# Patient Record
Sex: FEMALE | Race: BLACK OR AFRICAN AMERICAN | NOT HISPANIC OR LATINO | URBAN - METROPOLITAN AREA
[De-identification: names, ages, dates, MRNs, and addresses within clinical notes are randomized per-mention and may not be internally consistent; named-entity substitution may affect disease eponyms.]

---

## 2019-01-23 ENCOUNTER — EMERGENCY (EMERGENCY)
Facility: HOSPITAL | Age: 74
LOS: 0 days | Discharge: HOME | End: 2019-01-23
Attending: EMERGENCY MEDICINE
Payer: MEDICARE

## 2019-01-23 VITALS
SYSTOLIC BLOOD PRESSURE: 129 MMHG | DIASTOLIC BLOOD PRESSURE: 70 MMHG | OXYGEN SATURATION: 99 % | HEART RATE: 65 BPM | RESPIRATION RATE: 18 BRPM | TEMPERATURE: 98 F

## 2019-01-23 VITALS
OXYGEN SATURATION: 96 % | DIASTOLIC BLOOD PRESSURE: 97 MMHG | TEMPERATURE: 98 F | RESPIRATION RATE: 16 BRPM | HEART RATE: 75 BPM | HEIGHT: 66 IN | WEIGHT: 220.9 LBS | SYSTOLIC BLOOD PRESSURE: 186 MMHG

## 2019-01-23 DIAGNOSIS — E11.9 TYPE 2 DIABETES MELLITUS WITHOUT COMPLICATIONS: ICD-10-CM

## 2019-01-23 DIAGNOSIS — I10 ESSENTIAL (PRIMARY) HYPERTENSION: ICD-10-CM

## 2019-01-23 DIAGNOSIS — R51 HEADACHE: ICD-10-CM

## 2019-01-23 LAB
ALBUMIN SERPL ELPH-MCNC: 3.4 G/DL — SIGNIFICANT CHANGE UP (ref 3.3–5)
ALP SERPL-CCNC: 70 U/L — SIGNIFICANT CHANGE UP (ref 40–120)
ALT FLD-CCNC: 22 U/L — SIGNIFICANT CHANGE UP (ref 12–78)
ANION GAP SERPL CALC-SCNC: 6 MMOL/L — SIGNIFICANT CHANGE UP (ref 5–17)
APTT BLD: 32.8 SEC — SIGNIFICANT CHANGE UP (ref 28.5–37)
AST SERPL-CCNC: 23 U/L — SIGNIFICANT CHANGE UP (ref 15–37)
BASOPHILS # BLD AUTO: 0.06 K/UL — SIGNIFICANT CHANGE UP (ref 0–0.2)
BASOPHILS NFR BLD AUTO: 0.6 % — SIGNIFICANT CHANGE UP (ref 0–2)
BILIRUB SERPL-MCNC: 0.3 MG/DL — SIGNIFICANT CHANGE UP (ref 0.2–1.2)
BUN SERPL-MCNC: 18 MG/DL — SIGNIFICANT CHANGE UP (ref 7–23)
CALCIUM SERPL-MCNC: 8.7 MG/DL — SIGNIFICANT CHANGE UP (ref 8.5–10.1)
CHLORIDE SERPL-SCNC: 103 MMOL/L — SIGNIFICANT CHANGE UP (ref 96–108)
CK MB BLD-MCNC: 1.4 % — SIGNIFICANT CHANGE UP (ref 0–3.5)
CK MB CFR SERPL CALC: 2.7 NG/ML — SIGNIFICANT CHANGE UP (ref 0.5–3.6)
CK SERPL-CCNC: 189 U/L — SIGNIFICANT CHANGE UP (ref 26–192)
CO2 SERPL-SCNC: 30 MMOL/L — SIGNIFICANT CHANGE UP (ref 22–31)
CREAT SERPL-MCNC: 0.97 MG/DL — SIGNIFICANT CHANGE UP (ref 0.5–1.3)
EOSINOPHIL # BLD AUTO: 0.25 K/UL — SIGNIFICANT CHANGE UP (ref 0–0.5)
EOSINOPHIL NFR BLD AUTO: 2.6 % — SIGNIFICANT CHANGE UP (ref 0–6)
GLUCOSE BLDC GLUCOMTR-MCNC: 159 MG/DL — HIGH (ref 70–99)
GLUCOSE SERPL-MCNC: 159 MG/DL — HIGH (ref 70–99)
HCT VFR BLD CALC: 37.6 % — SIGNIFICANT CHANGE UP (ref 34.5–45)
HGB BLD-MCNC: 11.6 G/DL — SIGNIFICANT CHANGE UP (ref 11.5–15.5)
IMM GRANULOCYTES NFR BLD AUTO: 0.3 % — SIGNIFICANT CHANGE UP (ref 0–1.5)
INR BLD: 1.03 RATIO — SIGNIFICANT CHANGE UP (ref 0.88–1.16)
LYMPHOCYTES # BLD AUTO: 3.74 K/UL — HIGH (ref 1–3.3)
LYMPHOCYTES # BLD AUTO: 39.4 % — SIGNIFICANT CHANGE UP (ref 13–44)
MCHC RBC-ENTMCNC: 24.8 PG — LOW (ref 27–34)
MCHC RBC-ENTMCNC: 30.9 GM/DL — LOW (ref 32–36)
MCV RBC AUTO: 80.5 FL — SIGNIFICANT CHANGE UP (ref 80–100)
MONOCYTES # BLD AUTO: 0.5 K/UL — SIGNIFICANT CHANGE UP (ref 0–0.9)
MONOCYTES NFR BLD AUTO: 5.3 % — SIGNIFICANT CHANGE UP (ref 2–14)
NEUTROPHILS # BLD AUTO: 4.91 K/UL — SIGNIFICANT CHANGE UP (ref 1.8–7.4)
NEUTROPHILS NFR BLD AUTO: 51.8 % — SIGNIFICANT CHANGE UP (ref 43–77)
NRBC # BLD: 0 /100 WBCS — SIGNIFICANT CHANGE UP (ref 0–0)
PLATELET # BLD AUTO: 257 K/UL — SIGNIFICANT CHANGE UP (ref 150–400)
POTASSIUM SERPL-MCNC: 4.5 MMOL/L — SIGNIFICANT CHANGE UP (ref 3.5–5.3)
POTASSIUM SERPL-SCNC: 4.5 MMOL/L — SIGNIFICANT CHANGE UP (ref 3.5–5.3)
PROT SERPL-MCNC: 7.9 GM/DL — SIGNIFICANT CHANGE UP (ref 6–8.3)
PROTHROM AB SERPL-ACNC: 11.6 SEC — SIGNIFICANT CHANGE UP (ref 10–12.9)
RBC # BLD: 4.67 M/UL — SIGNIFICANT CHANGE UP (ref 3.8–5.2)
RBC # FLD: 14.1 % — SIGNIFICANT CHANGE UP (ref 10.3–14.5)
SODIUM SERPL-SCNC: 139 MMOL/L — SIGNIFICANT CHANGE UP (ref 135–145)
TROPONIN I SERPL-MCNC: <.015 NG/ML — SIGNIFICANT CHANGE UP (ref 0.01–0.04)
WBC # BLD: 9.49 K/UL — SIGNIFICANT CHANGE UP (ref 3.8–10.5)
WBC # FLD AUTO: 9.49 K/UL — SIGNIFICANT CHANGE UP (ref 3.8–10.5)

## 2019-01-23 PROCEDURE — 99284 EMERGENCY DEPT VISIT MOD MDM: CPT | Mod: 25

## 2019-01-23 PROCEDURE — 70450 CT HEAD/BRAIN W/O DYE: CPT | Mod: 26

## 2019-01-23 RX ORDER — METOCLOPRAMIDE HCL 10 MG
10 TABLET ORAL ONCE
Qty: 0 | Refills: 0 | Status: COMPLETED | OUTPATIENT
Start: 2019-01-23 | End: 2019-01-23

## 2019-01-23 RX ORDER — SODIUM CHLORIDE 9 MG/ML
1000 INJECTION INTRAMUSCULAR; INTRAVENOUS; SUBCUTANEOUS ONCE
Qty: 0 | Refills: 0 | Status: COMPLETED | OUTPATIENT
Start: 2019-01-23 | End: 2019-01-23

## 2019-01-23 RX ORDER — ACETAMINOPHEN 500 MG
975 TABLET ORAL ONCE
Qty: 0 | Refills: 0 | Status: COMPLETED | OUTPATIENT
Start: 2019-01-23 | End: 2019-01-23

## 2019-01-23 RX ADMIN — Medication 975 MILLIGRAM(S): at 01:54

## 2019-01-23 RX ADMIN — Medication 975 MILLIGRAM(S): at 01:24

## 2019-01-23 RX ADMIN — Medication 10 MILLIGRAM(S): at 01:25

## 2019-01-23 RX ADMIN — SODIUM CHLORIDE 1000 MILLILITER(S): 9 INJECTION INTRAMUSCULAR; INTRAVENOUS; SUBCUTANEOUS at 04:45

## 2019-01-23 NOTE — ED PROVIDER NOTE - OBJECTIVE STATEMENT
Pertinent PMH/PSH/FHx/SHx and Review of Systems contained within:    74yo F w PMH of breast ca, HTN & DM w neuropathy on gabapentin presents to ED for eval of R sided HA w photophobia & subjective parestheisas for the last 2d.  Pt states sx worse when she goes to bed at night.  Denies fever, chills, abd pain, nausea, vomiting, diaphoresis, palpitations, head trauma, LOC.  Pt has not taken any meds for sx PTA.    No fever/chills, +photophobia, no eye pain/changes in vision, No ear pain/sore throat/dysphagia, No palpitations, no SOB/cough/wheeze/stridor, No abdominal pain, No N/V/D, no dysuria/frequency/discharge, No neck/back pain, no rash

## 2019-01-23 NOTE — ED ADULT NURSE NOTE - NSIMPLEMENTINTERV_GEN_ALL_ED
Implemented All Universal Safety Interventions:  [place] to call system. Call bell, personal items and telephone within reach. Instruct patient to call for assistance. Room bathroom lighting operational. Non-slip footwear when patient is off stretcher. Physically safe environment: no spills, clutter or unnecessary equipment. Stretcher in lowest position, wheels locked, appropriate side rails in place.

## 2019-01-23 NOTE — ED PROVIDER NOTE - PHYSICAL EXAMINATION
Gen: Alert, NAD, speaking in complete sentences  Head: NC, AT, EOMI, normal lids/conjunctiva, no nystagmus  ENT: normal hearing, patent oropharynx, MMM  Neck: supple, no tenderness/meningismus/JVD, Trachea midline  Pulm: Bilateral clear BS, normal resp effort, no wheeze/stridor/retractions  CV: RRR, no M/R/G, +dist pulses  Abd: soft, NT/ND, +BS, no guarding/rebound tenderness  Mskel: no edema/erythema/cyanosis, motor 5/5 and equal in upper & lower ext bilaterally, steady gait  Skin: no rash  Neuro: AAOx3, no sensory/motor deficits, CN 2-12 intact, NIHSS 0

## 2019-01-23 NOTE — ED ADULT NURSE NOTE - OBJECTIVE STATEMENT
pt reports intermittent headache to right side of head x 3 days with numbness to right side of head. Pt also co being light headead. denies nausea and photophobia. using Motrin at home with mild relief. denies any changes in vision.

## 2019-01-23 NOTE — ED ADULT TRIAGE NOTE - CHIEF COMPLAINT QUOTE
Patient reports R- side headache and numbness x two days with dizziness denies chest pain hx htn, dm metoprolol, simvastatin, metformin, gabapentin

## 2019-01-23 NOTE — ED ADULT NURSE NOTE - PERIPHERAL VASCULAR
pt had witnessed seizure, did not hit head, did not fall, bit tongue. no obvious injury or trauma. no acute distress.
WDL

## 2019-01-23 NOTE — ED PROVIDER NOTE - MEDICAL DECISION MAKING DETAILS
Pt w HA, labs & imaging neg for acute pathology.  Resolved in ED w meds given.  Pt in NAD, VSS.  Given outpt neuro follow up.  Discussed results and outcome of testing with the patient, given copy as well.  Patient advised to please follow up with their primary care doctor within the next 24 hours and return to the Emergency Department for worsening symptoms or any other concerns.  Patient advised that their doctor may call  to follow up on the specific results of the tests performed today in the emergency department.

## 2019-05-09 ENCOUNTER — EMERGENCY (EMERGENCY)
Facility: HOSPITAL | Age: 74
LOS: 0 days | Discharge: HOME | End: 2019-05-09
Attending: EMERGENCY MEDICINE
Payer: MEDICARE

## 2019-05-09 VITALS
DIASTOLIC BLOOD PRESSURE: 86 MMHG | SYSTOLIC BLOOD PRESSURE: 157 MMHG | RESPIRATION RATE: 18 BRPM | HEART RATE: 88 BPM | OXYGEN SATURATION: 98 % | TEMPERATURE: 98 F

## 2019-05-09 VITALS
WEIGHT: 216.05 LBS | DIASTOLIC BLOOD PRESSURE: 84 MMHG | OXYGEN SATURATION: 99 % | HEART RATE: 85 BPM | TEMPERATURE: 98 F | HEIGHT: 66 IN | SYSTOLIC BLOOD PRESSURE: 164 MMHG | RESPIRATION RATE: 19 BRPM

## 2019-05-09 DIAGNOSIS — I10 ESSENTIAL (PRIMARY) HYPERTENSION: ICD-10-CM

## 2019-05-09 DIAGNOSIS — Z79.84 LONG TERM (CURRENT) USE OF ORAL HYPOGLYCEMIC DRUGS: ICD-10-CM

## 2019-05-09 DIAGNOSIS — I11.9 HYPERTENSIVE HEART DISEASE WITHOUT HEART FAILURE: ICD-10-CM

## 2019-05-09 DIAGNOSIS — R10.32 LEFT LOWER QUADRANT PAIN: ICD-10-CM

## 2019-05-09 DIAGNOSIS — E11.9 TYPE 2 DIABETES MELLITUS WITHOUT COMPLICATIONS: ICD-10-CM

## 2019-05-09 DIAGNOSIS — C50.919 MALIGNANT NEOPLASM OF UNSPECIFIED SITE OF UNSPECIFIED FEMALE BREAST: ICD-10-CM

## 2019-05-09 LAB
ALBUMIN SERPL ELPH-MCNC: 3.6 G/DL — SIGNIFICANT CHANGE UP (ref 3.3–5)
ALP SERPL-CCNC: 74 U/L — SIGNIFICANT CHANGE UP (ref 40–120)
ALT FLD-CCNC: 21 U/L — SIGNIFICANT CHANGE UP (ref 12–78)
ANION GAP SERPL CALC-SCNC: 7 MMOL/L — SIGNIFICANT CHANGE UP (ref 5–17)
APPEARANCE UR: CLEAR — SIGNIFICANT CHANGE UP
AST SERPL-CCNC: 16 U/L — SIGNIFICANT CHANGE UP (ref 15–37)
BASOPHILS # BLD AUTO: 0.05 K/UL — SIGNIFICANT CHANGE UP (ref 0–0.2)
BASOPHILS NFR BLD AUTO: 0.6 % — SIGNIFICANT CHANGE UP (ref 0–2)
BILIRUB SERPL-MCNC: 0.6 MG/DL — SIGNIFICANT CHANGE UP (ref 0.2–1.2)
BILIRUB UR-MCNC: NEGATIVE — SIGNIFICANT CHANGE UP
BUN SERPL-MCNC: 19 MG/DL — SIGNIFICANT CHANGE UP (ref 7–23)
CALCIUM SERPL-MCNC: 9.5 MG/DL — SIGNIFICANT CHANGE UP (ref 8.5–10.1)
CHLORIDE SERPL-SCNC: 103 MMOL/L — SIGNIFICANT CHANGE UP (ref 96–108)
CO2 SERPL-SCNC: 30 MMOL/L — SIGNIFICANT CHANGE UP (ref 22–31)
COLOR SPEC: YELLOW — SIGNIFICANT CHANGE UP
CREAT SERPL-MCNC: 0.9 MG/DL — SIGNIFICANT CHANGE UP (ref 0.5–1.3)
DIFF PNL FLD: ABNORMAL
EOSINOPHIL # BLD AUTO: 0.21 K/UL — SIGNIFICANT CHANGE UP (ref 0–0.5)
EOSINOPHIL NFR BLD AUTO: 2.6 % — SIGNIFICANT CHANGE UP (ref 0–6)
EPI CELLS # UR: SIGNIFICANT CHANGE UP
GLUCOSE SERPL-MCNC: 172 MG/DL — HIGH (ref 70–99)
GLUCOSE UR QL: NEGATIVE MG/DL — SIGNIFICANT CHANGE UP
HCT VFR BLD CALC: 39.3 % — SIGNIFICANT CHANGE UP (ref 34.5–45)
HGB BLD-MCNC: 12.1 G/DL — SIGNIFICANT CHANGE UP (ref 11.5–15.5)
IMM GRANULOCYTES NFR BLD AUTO: 0.2 % — SIGNIFICANT CHANGE UP (ref 0–1.5)
KETONES UR-MCNC: NEGATIVE — SIGNIFICANT CHANGE UP
LEUKOCYTE ESTERASE UR-ACNC: NEGATIVE — SIGNIFICANT CHANGE UP
LIDOCAIN IGE QN: 132 U/L — SIGNIFICANT CHANGE UP (ref 73–393)
LYMPHOCYTES # BLD AUTO: 2.64 K/UL — SIGNIFICANT CHANGE UP (ref 1–3.3)
LYMPHOCYTES # BLD AUTO: 32.9 % — SIGNIFICANT CHANGE UP (ref 13–44)
MCHC RBC-ENTMCNC: 25 PG — LOW (ref 27–34)
MCHC RBC-ENTMCNC: 30.8 GM/DL — LOW (ref 32–36)
MCV RBC AUTO: 81.2 FL — SIGNIFICANT CHANGE UP (ref 80–100)
MONOCYTES # BLD AUTO: 0.34 K/UL — SIGNIFICANT CHANGE UP (ref 0–0.9)
MONOCYTES NFR BLD AUTO: 4.2 % — SIGNIFICANT CHANGE UP (ref 2–14)
NEUTROPHILS # BLD AUTO: 4.76 K/UL — SIGNIFICANT CHANGE UP (ref 1.8–7.4)
NEUTROPHILS NFR BLD AUTO: 59.5 % — SIGNIFICANT CHANGE UP (ref 43–77)
NITRITE UR-MCNC: NEGATIVE — SIGNIFICANT CHANGE UP
NRBC # BLD: 0 /100 WBCS — SIGNIFICANT CHANGE UP (ref 0–0)
PH UR: 5 — SIGNIFICANT CHANGE UP (ref 5–8)
PLATELET # BLD AUTO: 246 K/UL — SIGNIFICANT CHANGE UP (ref 150–400)
POTASSIUM SERPL-MCNC: 4.3 MMOL/L — SIGNIFICANT CHANGE UP (ref 3.5–5.3)
POTASSIUM SERPL-SCNC: 4.3 MMOL/L — SIGNIFICANT CHANGE UP (ref 3.5–5.3)
PROT SERPL-MCNC: 8.5 GM/DL — HIGH (ref 6–8.3)
PROT UR-MCNC: NEGATIVE MG/DL — SIGNIFICANT CHANGE UP
RBC # BLD: 4.84 M/UL — SIGNIFICANT CHANGE UP (ref 3.8–5.2)
RBC # FLD: 14.2 % — SIGNIFICANT CHANGE UP (ref 10.3–14.5)
RBC CASTS # UR COMP ASSIST: SIGNIFICANT CHANGE UP /HPF (ref 0–4)
SODIUM SERPL-SCNC: 140 MMOL/L — SIGNIFICANT CHANGE UP (ref 135–145)
SP GR SPEC: 1.02 — SIGNIFICANT CHANGE UP (ref 1.01–1.02)
UROBILINOGEN FLD QL: NEGATIVE MG/DL — SIGNIFICANT CHANGE UP
WBC # BLD: 8.02 K/UL — SIGNIFICANT CHANGE UP (ref 3.8–10.5)
WBC # FLD AUTO: 8.02 K/UL — SIGNIFICANT CHANGE UP (ref 3.8–10.5)
WBC UR QL: SIGNIFICANT CHANGE UP

## 2019-05-09 PROCEDURE — 99284 EMERGENCY DEPT VISIT MOD MDM: CPT | Mod: 25

## 2019-05-09 PROCEDURE — 74177 CT ABD & PELVIS W/CONTRAST: CPT | Mod: 26

## 2019-05-09 RX ORDER — ACETAMINOPHEN 500 MG
975 TABLET ORAL ONCE
Refills: 0 | Status: COMPLETED | OUTPATIENT
Start: 2019-05-09 | End: 2019-05-09

## 2019-05-09 RX ORDER — IOHEXOL 300 MG/ML
30 INJECTION, SOLUTION INTRAVENOUS ONCE
Refills: 0 | Status: COMPLETED | OUTPATIENT
Start: 2019-05-09 | End: 2019-05-09

## 2019-05-09 RX ADMIN — IOHEXOL 30 MILLILITER(S): 300 INJECTION, SOLUTION INTRAVENOUS at 08:37

## 2019-05-09 RX ADMIN — Medication 975 MILLIGRAM(S): at 10:18

## 2019-05-09 NOTE — ED PROVIDER NOTE - PROGRESS NOTE DETAILS
Pt symptoms have improved, is tolerating PO. Labs wnl. CT shows concern for metastatic cancer. Pt informed of results, and printed out. Pt referred to f/u, and return precautions given. Ok for d/c.

## 2019-05-09 NOTE — ED PROVIDER NOTE - CLINICAL SUMMARY MEDICAL DECISION MAKING FREE TEXT BOX
Ddx: diverticulitis/ uti, kidney stone  Plan: cbc, cmp, lipase, requests tylenol for now, ct abd, reassess

## 2019-05-09 NOTE — ED ADULT TRIAGE NOTE - MEANS OF ARRIVAL
\"My  needs to be admitted for sepsis...\"    Pt was not with the caller.  Wife is the caller and she explains that the patient was seen in an Urgent Care today and told he needs to go to an ER to be admitted.  Wife states he is refusing and she is frightened.  This patient had sepsis two years ago and his wife does not seem to fully understand how he could not agree to going to hospital after being so ill two years ago.      The primary reason for her call was to ask if we can\"pre-admit\" this man to the hospital.  She also is asking if someone can come to the home and convince him he needs care.      Wife was advised to call back with her  if she wants RN to speak with him.  She agrees.     Lois Aldana RN 1/30/2018           
Regarding: Pneumonia Concerns  ----- Message from Jenn Brown sent at 1/30/2018  4:59 PM CST -----  Patient Name: Stuart Florez  Specialist or PCP:Carmel Benson  Pregnant (If Yes, how long?):  Symptoms:Diagnosed pneumonia and wanting to know how she can convince him to go to hospital  Call Back #[phone]  Is the patient’s permanent residence located in [place], [place], or a [facility]? Yes [facility] [id number]  Call Center Account #:[id number]      
ambulatory

## 2019-05-09 NOTE — ED PROVIDER NOTE - OBJECTIVE STATEMENT
Pt is a 73 yo lady with a pmhx of HTn, NIDM, HL, breast ca sp chemo who presents to the ED with LLQ abdominal pain. It started 2 weeks ago. Has been sharp, constant. No hx of this in the past. No dysuria, no fevers, no chest pain, no sob. No hx of kidney stone. No hx of diverticulitis. No n/v, had single episode of diverticulitis.

## 2019-05-09 NOTE — ED ADULT NURSE NOTE - NS ED NURSE IV DC DT
Spoke to patient's  Herberth Mendez, he was advised of Maude's recommendations. He states his understanding. He was advised to call back if patient's reflux worsen or develops stomach pain. No further questions at this time. 09-May-2019 13:38

## 2019-05-09 NOTE — ED ADULT NURSE NOTE - OBJECTIVE STATEMENT
Pt is a 74YOF who is here with abdominal pain, pt states it has been ongoing for 2 weeks, pt states that it is lower abdominal pain that spreads to either flank, pt states she has urinary frequency, but not pain when urinating and pt states her urine does not have any odor. No change in bowel pattern.

## 2019-05-09 NOTE — ED PROVIDER NOTE - CARE PLAN
Principal Discharge DX:	Malignant neoplasm of female breast, unspecified estrogen receptor status, unspecified laterality, unspecified site of breast

## 2019-05-09 NOTE — ED ADULT TRIAGE NOTE - CHIEF COMPLAINT QUOTE
patient c/o of L lower abdominal pain radiating in the back started last week , denied dysuria no blood in urine or stool , denied diarrhea no constipation , denied N/V , denied chest pain denied difficulty breathing  at the time of triage

## 2019-05-10 LAB
CULTURE RESULTS: SIGNIFICANT CHANGE UP
SPECIMEN SOURCE: SIGNIFICANT CHANGE UP

## 2019-07-24 ENCOUNTER — EMERGENCY (EMERGENCY)
Facility: HOSPITAL | Age: 74
LOS: 0 days | Discharge: HOME | End: 2019-07-25
Attending: EMERGENCY MEDICINE
Payer: MEDICARE

## 2019-07-24 VITALS
HEIGHT: 66 IN | SYSTOLIC BLOOD PRESSURE: 158 MMHG | TEMPERATURE: 98 F | DIASTOLIC BLOOD PRESSURE: 88 MMHG | WEIGHT: 270.07 LBS | RESPIRATION RATE: 17 BRPM | HEART RATE: 80 BPM | OXYGEN SATURATION: 97 %

## 2019-07-24 DIAGNOSIS — Z79.84 LONG TERM (CURRENT) USE OF ORAL HYPOGLYCEMIC DRUGS: ICD-10-CM

## 2019-07-24 DIAGNOSIS — N20.0 CALCULUS OF KIDNEY: ICD-10-CM

## 2019-07-24 DIAGNOSIS — I10 ESSENTIAL (PRIMARY) HYPERTENSION: ICD-10-CM

## 2019-07-24 DIAGNOSIS — Z85.3 PERSONAL HISTORY OF MALIGNANT NEOPLASM OF BREAST: ICD-10-CM

## 2019-07-24 DIAGNOSIS — E11.9 TYPE 2 DIABETES MELLITUS WITHOUT COMPLICATIONS: ICD-10-CM

## 2019-07-24 DIAGNOSIS — R10.9 UNSPECIFIED ABDOMINAL PAIN: ICD-10-CM

## 2019-07-24 DIAGNOSIS — N39.0 URINARY TRACT INFECTION, SITE NOT SPECIFIED: ICD-10-CM

## 2019-07-24 LAB
ALBUMIN SERPL ELPH-MCNC: 3.3 G/DL — SIGNIFICANT CHANGE UP (ref 3.3–5)
ALP SERPL-CCNC: 77 U/L — SIGNIFICANT CHANGE UP (ref 40–120)
ALT FLD-CCNC: 26 U/L — SIGNIFICANT CHANGE UP (ref 12–78)
AMYLASE P1 CFR SERPL: 56 U/L — SIGNIFICANT CHANGE UP (ref 25–115)
ANION GAP SERPL CALC-SCNC: 7 MMOL/L — SIGNIFICANT CHANGE UP (ref 5–17)
AST SERPL-CCNC: 20 U/L — SIGNIFICANT CHANGE UP (ref 15–37)
BASOPHILS # BLD AUTO: 0.05 K/UL — SIGNIFICANT CHANGE UP (ref 0–0.2)
BASOPHILS NFR BLD AUTO: 0.4 % — SIGNIFICANT CHANGE UP (ref 0–2)
BILIRUB SERPL-MCNC: 0.3 MG/DL — SIGNIFICANT CHANGE UP (ref 0.2–1.2)
BUN SERPL-MCNC: 17 MG/DL — SIGNIFICANT CHANGE UP (ref 7–23)
CALCIUM SERPL-MCNC: 9 MG/DL — SIGNIFICANT CHANGE UP (ref 8.5–10.1)
CHLORIDE SERPL-SCNC: 102 MMOL/L — SIGNIFICANT CHANGE UP (ref 96–108)
CO2 SERPL-SCNC: 28 MMOL/L — SIGNIFICANT CHANGE UP (ref 22–31)
CREAT SERPL-MCNC: 0.9 MG/DL — SIGNIFICANT CHANGE UP (ref 0.5–1.3)
EOSINOPHIL # BLD AUTO: 0.29 K/UL — SIGNIFICANT CHANGE UP (ref 0–0.5)
EOSINOPHIL NFR BLD AUTO: 2.5 % — SIGNIFICANT CHANGE UP (ref 0–6)
GLUCOSE SERPL-MCNC: 162 MG/DL — HIGH (ref 70–99)
HCT VFR BLD CALC: 37.5 % — SIGNIFICANT CHANGE UP (ref 34.5–45)
HGB BLD-MCNC: 11.8 G/DL — SIGNIFICANT CHANGE UP (ref 11.5–15.5)
IMM GRANULOCYTES NFR BLD AUTO: 0.3 % — SIGNIFICANT CHANGE UP (ref 0–1.5)
LIDOCAIN IGE QN: 101 U/L — SIGNIFICANT CHANGE UP (ref 73–393)
LYMPHOCYTES # BLD AUTO: 29.9 % — SIGNIFICANT CHANGE UP (ref 13–44)
LYMPHOCYTES # BLD AUTO: 3.42 K/UL — HIGH (ref 1–3.3)
MCHC RBC-ENTMCNC: 25.2 PG — LOW (ref 27–34)
MCHC RBC-ENTMCNC: 31.5 GM/DL — LOW (ref 32–36)
MCV RBC AUTO: 80 FL — SIGNIFICANT CHANGE UP (ref 80–100)
MONOCYTES # BLD AUTO: 0.54 K/UL — SIGNIFICANT CHANGE UP (ref 0–0.9)
MONOCYTES NFR BLD AUTO: 4.7 % — SIGNIFICANT CHANGE UP (ref 2–14)
NEUTROPHILS # BLD AUTO: 7.09 K/UL — SIGNIFICANT CHANGE UP (ref 1.8–7.4)
NEUTROPHILS NFR BLD AUTO: 62.2 % — SIGNIFICANT CHANGE UP (ref 43–77)
NRBC # BLD: 0 /100 WBCS — SIGNIFICANT CHANGE UP (ref 0–0)
PLATELET # BLD AUTO: 250 K/UL — SIGNIFICANT CHANGE UP (ref 150–400)
POTASSIUM SERPL-MCNC: 4.2 MMOL/L — SIGNIFICANT CHANGE UP (ref 3.5–5.3)
POTASSIUM SERPL-SCNC: 4.2 MMOL/L — SIGNIFICANT CHANGE UP (ref 3.5–5.3)
PROT SERPL-MCNC: 7.7 GM/DL — SIGNIFICANT CHANGE UP (ref 6–8.3)
RBC # BLD: 4.69 M/UL — SIGNIFICANT CHANGE UP (ref 3.8–5.2)
RBC # FLD: 14.2 % — SIGNIFICANT CHANGE UP (ref 10.3–14.5)
SODIUM SERPL-SCNC: 137 MMOL/L — SIGNIFICANT CHANGE UP (ref 135–145)
WBC # BLD: 11.42 K/UL — HIGH (ref 3.8–10.5)
WBC # FLD AUTO: 11.42 K/UL — HIGH (ref 3.8–10.5)

## 2019-07-24 PROCEDURE — 99284 EMERGENCY DEPT VISIT MOD MDM: CPT

## 2019-07-24 PROCEDURE — 74177 CT ABD & PELVIS W/CONTRAST: CPT | Mod: 26

## 2019-07-24 RX ORDER — METHOCARBAMOL 500 MG/1
750 TABLET, FILM COATED ORAL ONCE
Refills: 0 | Status: COMPLETED | OUTPATIENT
Start: 2019-07-24 | End: 2019-07-24

## 2019-07-24 RX ORDER — SODIUM CHLORIDE 9 MG/ML
1000 INJECTION INTRAMUSCULAR; INTRAVENOUS; SUBCUTANEOUS ONCE
Refills: 0 | Status: COMPLETED | OUTPATIENT
Start: 2019-07-24 | End: 2019-07-24

## 2019-07-24 RX ORDER — KETOROLAC TROMETHAMINE 30 MG/ML
15 SYRINGE (ML) INJECTION ONCE
Refills: 0 | Status: DISCONTINUED | OUTPATIENT
Start: 2019-07-24 | End: 2019-07-24

## 2019-07-24 RX ADMIN — Medication 15 MILLIGRAM(S): at 21:41

## 2019-07-24 RX ADMIN — SODIUM CHLORIDE 1000 MILLILITER(S): 9 INJECTION INTRAMUSCULAR; INTRAVENOUS; SUBCUTANEOUS at 21:42

## 2019-07-24 RX ADMIN — METHOCARBAMOL 750 MILLIGRAM(S): 500 TABLET, FILM COATED ORAL at 21:41

## 2019-07-24 NOTE — ED PROVIDER NOTE - CLINICAL SUMMARY MEDICAL DECISION MAKING FREE TEXT BOX
Patient with right flank pain, looks well, nonseptic.  VSS.  Labs reviewed, UA+ for infection, possible small renal stones as well, will cover for pyelonephritis.  Patient resting comfortably without significant pain.  Patient aware of metstatic disease and is also getting worked up for it.  Discussed results and outcome of today's visit with the patient.  Patient advised to please follow up with another healthcare provider within the next 24 hours and return to the Emergency Department for worsening symptoms or any other concerns.  Patient advised that their doctor may call  to follow up on the specific results of the tests performed today in the emergency department.   Patient appears well on discharge. Patient with right flank pain, looks well, nonseptic.  VSS.  Labs reviewed, UA+ for infection, possible small renal stones as well, will cover for pyelonephritis although pain more likely from renal calculi.  Patient resting comfortably without significant pain.  Patient aware of metstatic disease and is also getting worked up for it, currently not immunocompromised getting chemo.  Discussed results and outcome of today's visit with the patient.  Patient advised to please follow up with another healthcare provider within the next 24 hours and return to the Emergency Department for worsening symptoms or any other concerns.  Patient advised that their doctor may call  to follow up on the specific results of the tests performed today in the emergency department.   Patient appears well on discharge.

## 2019-07-24 NOTE — ED PROVIDER NOTE - PHYSICAL EXAMINATION
Gen: Alert, NAD, well appearing  Head: NC, AT, PERRL, EOMI, normal lids/conjunctiva  ENT: normal hearing, patent oropharynx without erythema/exudate, uvula midline  Neck: +supple, no tenderness/meningismus/JVD, +Trachea midline  Pulm: Bilateral BS, normal resp effort, no wheeze/stridor/retractions  CV: RRR, no M/R/G, +dist pulses  Abd: soft, NT/ND, Negative [place] signs, +BS, no palpable masses  Mskel: no edema/erythema/cyanosis, no MIDLINE back pain, +right paralumbar and parathoracic msk tenderness to palp  Skin: no rash, warm/dry  Neuro: AAOx3, no apparent sensory/motor deficits, coordination intact

## 2019-07-24 NOTE — ED ADULT TRIAGE NOTE - CHIEF COMPLAINT QUOTE
right flank pain started today, had a liver biopsy last week 7/18 at [facility], took Tylenol 500mg at 530 pm it not reliving pain

## 2019-07-24 NOTE — ED ADULT NURSE NOTE - OBJECTIVE STATEMENT
PT HERE WITH C/O RIGHT FLANK PAIN SINCE TODAY S/P LIVER BIOPSY DONE ON 7/18/19, TYLENOL 500MG TAKEN WITH NO RELIEF

## 2019-07-24 NOTE — ED PROVIDER NOTE - CARE PLAN
Principal Discharge DX:	UTI (urinary tract infection)  Secondary Diagnosis:	Kidney stone on right side

## 2019-07-24 NOTE — ED PROVIDER NOTE - OBJECTIVE STATEMENT
Pertinent PMH/PSH/FHx/SHx and Review of Systems contained within:  Patient presents to the ED for right flank pain x1 day.  Pain is nonradiating, noticed it after she came home from grocery shopping.  Denies assc chest pain, dyspnea, n/v, hematuria, dysuria, or abdominal pain.  Took tylenol for pain at home with some relief.  Denies saddle numbness or leg weakness.  Denies falls.  PAtient recently diagnosed with metastatic breast CA, present since 2015, had completed chemo/rad, now being worked up o/p has had PET scan, mamogram, lung and liver biopsies by her oncologist in the last month, denies any RUQ pain.     Relevant PMHx/SHx/SOCHx/FAMH:  HTN, HLD, DM, breast ca  Patient denies EtOH/tobacco/illicit substance use.    ROS: No fever/chills, No headache/photophobia/eye pain/changes in vision, No ear pain/sore throat/dysphagia, No chest pain/palpitations, no SOB/cough/wheeze/stridor, No abdominal pain, No N/V/D/melena, no dysuria/frequency/discharge, No neck pain, no rash, no changes in neurological status/function.

## 2019-07-24 NOTE — ED ADULT NURSE NOTE - CHIEF COMPLAINT QUOTE
right flank pain started today, had a liver biopsy last week 7/18 at [facility], took Tylenol 500mg at 530 pm it not reliving pain

## 2019-07-24 NOTE — ED ADULT NURSE NOTE - NSIMPLEMENTINTERV_GEN_ALL_ED
Implemented All Universal Safety Interventions:  [place] to call system. Call bell, personal items and telephone within reach. Instruct patient to call for assistance. Room bathroom lighting operational. Non-slip footwear when patient is off stretcher. Physically safe environment: no spills, clutter or unnecessary equipment. Stretcher in lowest position, wheels locked, appropriate side rails in place.

## 2019-07-25 VITALS
DIASTOLIC BLOOD PRESSURE: 79 MMHG | TEMPERATURE: 98 F | HEART RATE: 67 BPM | SYSTOLIC BLOOD PRESSURE: 161 MMHG | OXYGEN SATURATION: 100 % | RESPIRATION RATE: 14 BRPM

## 2019-07-25 LAB
APPEARANCE UR: CLEAR — SIGNIFICANT CHANGE UP
BACTERIA # UR AUTO: ABNORMAL
BILIRUB UR-MCNC: NEGATIVE — SIGNIFICANT CHANGE UP
COLOR SPEC: YELLOW — SIGNIFICANT CHANGE UP
DIFF PNL FLD: NEGATIVE — SIGNIFICANT CHANGE UP
EPI CELLS # UR: ABNORMAL
GLUCOSE UR QL: NEGATIVE MG/DL — SIGNIFICANT CHANGE UP
KETONES UR-MCNC: NEGATIVE — SIGNIFICANT CHANGE UP
LEUKOCYTE ESTERASE UR-ACNC: ABNORMAL
NITRITE UR-MCNC: NEGATIVE — SIGNIFICANT CHANGE UP
PH UR: 5 — SIGNIFICANT CHANGE UP (ref 5–8)
PROT UR-MCNC: NEGATIVE MG/DL — SIGNIFICANT CHANGE UP
RBC CASTS # UR COMP ASSIST: SIGNIFICANT CHANGE UP /HPF (ref 0–4)
SP GR SPEC: 1.01 — SIGNIFICANT CHANGE UP (ref 1.01–1.02)
UROBILINOGEN FLD QL: NEGATIVE MG/DL — SIGNIFICANT CHANGE UP
WBC UR QL: ABNORMAL

## 2019-07-25 RX ORDER — CEFTRIAXONE 500 MG/1
1000 INJECTION, POWDER, FOR SOLUTION INTRAMUSCULAR; INTRAVENOUS ONCE
Refills: 0 | Status: COMPLETED | OUTPATIENT
Start: 2019-07-25 | End: 2019-07-25

## 2019-07-25 RX ORDER — IBUPROFEN 200 MG
1 TABLET ORAL
Qty: 20 | Refills: 0
Start: 2019-07-25 | End: 2019-07-29

## 2019-07-25 RX ORDER — CEPHALEXIN 500 MG
1 CAPSULE ORAL
Qty: 28 | Refills: 0
Start: 2019-07-25 | End: 2019-08-07

## 2019-07-25 RX ADMIN — CEFTRIAXONE 100 MILLIGRAM(S): 500 INJECTION, POWDER, FOR SOLUTION INTRAMUSCULAR; INTRAVENOUS at 03:36

## 2019-07-25 RX ADMIN — CEFTRIAXONE 1000 MILLIGRAM(S): 500 INJECTION, POWDER, FOR SOLUTION INTRAMUSCULAR; INTRAVENOUS at 04:06

## 2019-07-25 NOTE — ED ADULT NURSE REASSESSMENT NOTE - NS ED NURSE REASSESS COMMENT FT1
Pt found resting on stretcher. No new complaints. iv abx administered as per orders. will continue to monitor awaiting dispo. pt denies pain at this time.

## 2019-07-26 LAB
CULTURE RESULTS: SIGNIFICANT CHANGE UP
SPECIMEN SOURCE: SIGNIFICANT CHANGE UP

## 2020-05-25 ENCOUNTER — APPOINTMENT (OUTPATIENT)
Dept: DISASTER EMERGENCY | Facility: CLINIC | Age: 75
End: 2020-05-25

## 2020-05-25 DIAGNOSIS — Z01.818 ENCOUNTER FOR OTHER PREPROCEDURAL EXAMINATION: ICD-10-CM

## 2020-05-26 LAB — SARS-COV-2 N GENE NPH QL NAA+PROBE: NOT DETECTED

## 2020-05-29 ENCOUNTER — APPOINTMENT (OUTPATIENT)
Dept: DISASTER EMERGENCY | Facility: CLINIC | Age: 75
End: 2020-05-29

## 2020-11-29 ENCOUNTER — EMERGENCY (EMERGENCY)
Facility: HOSPITAL | Age: 75
LOS: 0 days | Discharge: HOME | End: 2020-11-30
Attending: EMERGENCY MEDICINE
Payer: MEDICARE

## 2020-11-29 VITALS
TEMPERATURE: 99 F | SYSTOLIC BLOOD PRESSURE: 117 MMHG | WEIGHT: 212.08 LBS | HEART RATE: 111 BPM | HEIGHT: 66 IN | OXYGEN SATURATION: 98 % | DIASTOLIC BLOOD PRESSURE: 113 MMHG | RESPIRATION RATE: 18 BRPM

## 2020-11-29 DIAGNOSIS — I10 ESSENTIAL (PRIMARY) HYPERTENSION: ICD-10-CM

## 2020-11-29 DIAGNOSIS — Z86.79 PERSONAL HISTORY OF OTHER DISEASES OF THE CIRCULATORY SYSTEM: ICD-10-CM

## 2020-11-29 DIAGNOSIS — R06.02 SHORTNESS OF BREATH: ICD-10-CM

## 2020-11-29 DIAGNOSIS — R11.0 NAUSEA: ICD-10-CM

## 2020-11-29 DIAGNOSIS — C79.9 SECONDARY MALIGNANT NEOPLASM OF UNSPECIFIED SITE: ICD-10-CM

## 2020-11-29 DIAGNOSIS — E11.9 TYPE 2 DIABETES MELLITUS WITHOUT COMPLICATIONS: ICD-10-CM

## 2020-11-29 DIAGNOSIS — C50.919 MALIGNANT NEOPLASM OF UNSPECIFIED SITE OF UNSPECIFIED FEMALE BREAST: ICD-10-CM

## 2020-11-29 DIAGNOSIS — E89.89 OTHER POSTPROCEDURAL ENDOCRINE AND METABOLIC COMPLICATIONS AND DISORDERS: Chronic | ICD-10-CM

## 2020-11-29 DIAGNOSIS — Z92.21 PERSONAL HISTORY OF ANTINEOPLASTIC CHEMOTHERAPY: ICD-10-CM

## 2020-11-29 DIAGNOSIS — R10.11 RIGHT UPPER QUADRANT PAIN: ICD-10-CM

## 2020-11-29 DIAGNOSIS — E78.5 HYPERLIPIDEMIA, UNSPECIFIED: ICD-10-CM

## 2020-11-29 DIAGNOSIS — Z79.84 LONG TERM (CURRENT) USE OF ORAL HYPOGLYCEMIC DRUGS: ICD-10-CM

## 2020-11-29 PROCEDURE — 71045 X-RAY EXAM CHEST 1 VIEW: CPT | Mod: 26

## 2020-11-29 PROCEDURE — 93010 ELECTROCARDIOGRAM REPORT: CPT

## 2020-11-29 PROCEDURE — 99285 EMERGENCY DEPT VISIT HI MDM: CPT

## 2020-11-29 PROCEDURE — 76700 US EXAM ABDOM COMPLETE: CPT | Mod: 26

## 2020-11-29 RX ORDER — ONDANSETRON 8 MG/1
4 TABLET, FILM COATED ORAL ONCE
Refills: 0 | Status: COMPLETED | OUTPATIENT
Start: 2020-11-29 | End: 2020-11-29

## 2020-11-29 RX ORDER — SODIUM CHLORIDE 9 MG/ML
1000 INJECTION INTRAMUSCULAR; INTRAVENOUS; SUBCUTANEOUS ONCE
Refills: 0 | Status: COMPLETED | OUTPATIENT
Start: 2020-11-29 | End: 2020-11-29

## 2020-11-29 RX ORDER — MORPHINE SULFATE 50 MG/1
4 CAPSULE, EXTENDED RELEASE ORAL ONCE
Refills: 0 | Status: DISCONTINUED | OUTPATIENT
Start: 2020-11-29 | End: 2020-11-29

## 2020-11-29 RX ORDER — DULOXETINE HYDROCHLORIDE 30 MG/1
1 CAPSULE, DELAYED RELEASE ORAL
Qty: 0 | Refills: 0 | DISCHARGE

## 2020-11-29 RX ORDER — MECLIZINE HCL 12.5 MG
1 TABLET ORAL
Qty: 0 | Refills: 0 | DISCHARGE

## 2020-11-29 RX ADMIN — MORPHINE SULFATE 4 MILLIGRAM(S): 50 CAPSULE, EXTENDED RELEASE ORAL at 23:45

## 2020-11-29 RX ADMIN — SODIUM CHLORIDE 1000 MILLILITER(S): 9 INJECTION INTRAMUSCULAR; INTRAVENOUS; SUBCUTANEOUS at 23:45

## 2020-11-29 RX ADMIN — ONDANSETRON 4 MILLIGRAM(S): 8 TABLET, FILM COATED ORAL at 23:45

## 2020-11-29 NOTE — ED PROVIDER NOTE - CARE PROVIDER_API CALL
Delano Jeronimo  INTERNAL MEDICINE  [address] [place] [address], [address]  [place] [address]  Phone: [phone]  Fax: [phone]  Follow Up Time: 4-6 Days

## 2020-11-29 NOTE — ED PROVIDER NOTE - PATIENT PORTAL LINK FT
You can access the FollowMyHealth Patient Portal offered by [facility] by registering at the following website: [email or web address]. By joining [facility]’s FollowMyHealth portal, you will also be able to view your health information using other applications (apps) compatible with our system.

## 2020-11-29 NOTE — ED PROVIDER NOTE - PROGRESS NOTE DETAILS
Pt is feeling better after medications. CT is negative for PE, troponin negative. Pt has inceased metastasis of her cancer. Results discussed with patient, she is in discussions with her oncologist about restarting chemotherapy vs alternative therapies. Pt is ready for d/c, and given pain medications, and ready for d/c for her daughter to pick her up.

## 2020-11-29 NOTE — ED PROVIDER NOTE - NSFOLLOWUPCLINICS_GEN_ALL_ED_FT
[facility]  Hematology/Oncology  [address] [id number]  Phone: [phone]  Fax:   Follow Up Time: 4-6 Days

## 2020-11-29 NOTE — ED ADULT TRIAGE NOTE - CHIEF COMPLAINT QUOTE
Pt complains of pain (points to epigastric area) 9/10 this x evening Pt complains of pain (points to epigastric area) 9/10 this x evening. Pt with a history of breast cancer

## 2020-11-29 NOTE — ED ADULT NURSE NOTE - CHIEF COMPLAINT QUOTE
Pt complains of pain (points to epigastric area) 9/10 this x evening. Pt with a history of breast cancer

## 2020-11-29 NOTE — ED PROVIDER NOTE - OBJECTIVE STATEMENT
Pt is a 75 year old female HTN, HLD, DM, current breast cancer stopped chemo in September, presents to the ED today for constant nonradiating epigastric pain x 6:30 PM after she ate. Pt c/o nausea, and difficulty breathing due to pain. Denies vomit, fevers, coughs, diarrhea, known hx of gallstones. Patient denies EtOH/tobacco/illicit substance use.

## 2020-11-29 NOTE — ED ADULT NURSE NOTE - ED STAT RN HANDOFF DETAILS
Assuming patient's care for coverage. Report received from JADA Garces and patient informed during rounding. Assessment available on KB. Will continue to monitor. on cardiac monitor at bedside. IVF infusing. awaiting lab results. awaiting CT

## 2020-11-29 NOTE — ED ADULT NURSE NOTE - OBJECTIVE STATEMENT
Pt received at bed A, A&Ox4. Pt co of epigastric abdominal pain, that radiates to chest. Pt states discomfort started this evening, and got worse. Pt currently has breast cancer, hx of R underarm lymph node removal. R arm pink band placed. Denies diarrhea, NV, fevers and chills. Pt placed on reverse iso.

## 2020-11-29 NOTE — ED PROVIDER NOTE - CLINICAL SUMMARY MEDICAL DECISION MAKING FREE TEXT BOX
DDx: Cholecystitis/Cholelithiasis/pancreatitis/ACS/PE  Plan: CBC, CMP, troponin, D-dimer, chest X-ray, UA abdomen, pain control. reassess

## 2020-11-30 VITALS
HEART RATE: 93 BPM | OXYGEN SATURATION: 97 % | DIASTOLIC BLOOD PRESSURE: 77 MMHG | RESPIRATION RATE: 18 BRPM | SYSTOLIC BLOOD PRESSURE: 147 MMHG | TEMPERATURE: 98 F

## 2020-11-30 LAB
ALBUMIN SERPL ELPH-MCNC: 2.9 G/DL — LOW (ref 3.3–5)
ALP SERPL-CCNC: 134 U/L — HIGH (ref 40–120)
ALT FLD-CCNC: 63 U/L — SIGNIFICANT CHANGE UP (ref 12–78)
ANION GAP SERPL CALC-SCNC: 7 MMOL/L — SIGNIFICANT CHANGE UP (ref 5–17)
APPEARANCE UR: CLEAR — SIGNIFICANT CHANGE UP
APTT BLD: 27 SEC — LOW (ref 27.5–35.5)
AST SERPL-CCNC: 76 U/L — HIGH (ref 15–37)
BASOPHILS # BLD AUTO: 0.07 K/UL — SIGNIFICANT CHANGE UP (ref 0–0.2)
BASOPHILS NFR BLD AUTO: 0.7 % — SIGNIFICANT CHANGE UP (ref 0–2)
BILIRUB SERPL-MCNC: 0.5 MG/DL — SIGNIFICANT CHANGE UP (ref 0.2–1.2)
BILIRUB UR-MCNC: NEGATIVE — SIGNIFICANT CHANGE UP
BUN SERPL-MCNC: 11 MG/DL — SIGNIFICANT CHANGE UP (ref 7–23)
CALCIUM SERPL-MCNC: 8.9 MG/DL — SIGNIFICANT CHANGE UP (ref 8.5–10.1)
CHLORIDE SERPL-SCNC: 102 MMOL/L — SIGNIFICANT CHANGE UP (ref 96–108)
CO2 SERPL-SCNC: 28 MMOL/L — SIGNIFICANT CHANGE UP (ref 22–31)
COLOR SPEC: YELLOW — SIGNIFICANT CHANGE UP
CREAT SERPL-MCNC: 0.73 MG/DL — SIGNIFICANT CHANGE UP (ref 0.5–1.3)
D DIMER BLD IA.RAPID-MCNC: 2001 NG/ML DDU — HIGH
DIFF PNL FLD: NEGATIVE — SIGNIFICANT CHANGE UP
EOSINOPHIL # BLD AUTO: 0.16 K/UL — SIGNIFICANT CHANGE UP (ref 0–0.5)
EOSINOPHIL NFR BLD AUTO: 1.5 % — SIGNIFICANT CHANGE UP (ref 0–6)
EPI CELLS # UR: SIGNIFICANT CHANGE UP
GLUCOSE SERPL-MCNC: 151 MG/DL — HIGH (ref 70–99)
GLUCOSE UR QL: NEGATIVE MG/DL — SIGNIFICANT CHANGE UP
HCT VFR BLD CALC: 36.8 % — SIGNIFICANT CHANGE UP (ref 34.5–45)
HGB BLD-MCNC: 11.6 G/DL — SIGNIFICANT CHANGE UP (ref 11.5–15.5)
IMM GRANULOCYTES NFR BLD AUTO: 0.2 % — SIGNIFICANT CHANGE UP (ref 0–1.5)
INR BLD: 1.15 RATIO — SIGNIFICANT CHANGE UP (ref 0.88–1.16)
KETONES UR-MCNC: NEGATIVE — SIGNIFICANT CHANGE UP
LEUKOCYTE ESTERASE UR-ACNC: ABNORMAL
LIDOCAIN IGE QN: 97 U/L — SIGNIFICANT CHANGE UP (ref 73–393)
LYMPHOCYTES # BLD AUTO: 29.4 % — SIGNIFICANT CHANGE UP (ref 13–44)
LYMPHOCYTES # BLD AUTO: 3.16 K/UL — SIGNIFICANT CHANGE UP (ref 1–3.3)
MCHC RBC-ENTMCNC: 24.6 PG — LOW (ref 27–34)
MCHC RBC-ENTMCNC: 31.5 GM/DL — LOW (ref 32–36)
MCV RBC AUTO: 78.1 FL — LOW (ref 80–100)
MONOCYTES # BLD AUTO: 0.62 K/UL — SIGNIFICANT CHANGE UP (ref 0–0.9)
MONOCYTES NFR BLD AUTO: 5.8 % — SIGNIFICANT CHANGE UP (ref 2–14)
NEUTROPHILS # BLD AUTO: 6.71 K/UL — SIGNIFICANT CHANGE UP (ref 1.8–7.4)
NEUTROPHILS NFR BLD AUTO: 62.4 % — SIGNIFICANT CHANGE UP (ref 43–77)
NITRITE UR-MCNC: NEGATIVE — SIGNIFICANT CHANGE UP
NRBC # BLD: 0 /100 WBCS — SIGNIFICANT CHANGE UP (ref 0–0)
PH UR: 5 — SIGNIFICANT CHANGE UP (ref 5–8)
PLATELET # BLD AUTO: 252 K/UL — SIGNIFICANT CHANGE UP (ref 150–400)
POTASSIUM SERPL-MCNC: 3.9 MMOL/L — SIGNIFICANT CHANGE UP (ref 3.5–5.3)
POTASSIUM SERPL-SCNC: 3.9 MMOL/L — SIGNIFICANT CHANGE UP (ref 3.5–5.3)
PROT SERPL-MCNC: 7.5 GM/DL — SIGNIFICANT CHANGE UP (ref 6–8.3)
PROT UR-MCNC: NEGATIVE MG/DL — SIGNIFICANT CHANGE UP
PROTHROM AB SERPL-ACNC: 13.2 SEC — SIGNIFICANT CHANGE UP (ref 10.6–13.6)
RBC # BLD: 4.71 M/UL — SIGNIFICANT CHANGE UP (ref 3.8–5.2)
RBC # FLD: 14.2 % — SIGNIFICANT CHANGE UP (ref 10.3–14.5)
RBC CASTS # UR COMP ASSIST: SIGNIFICANT CHANGE UP /HPF (ref 0–4)
SODIUM SERPL-SCNC: 137 MMOL/L — SIGNIFICANT CHANGE UP (ref 135–145)
SP GR SPEC: 1.01 — SIGNIFICANT CHANGE UP (ref 1.01–1.02)
TROPONIN I SERPL-MCNC: <.015 NG/ML — SIGNIFICANT CHANGE UP (ref 0.01–0.04)
UROBILINOGEN FLD QL: NEGATIVE MG/DL — SIGNIFICANT CHANGE UP
WBC # BLD: 10.74 K/UL — HIGH (ref 3.8–10.5)
WBC # FLD AUTO: 10.74 K/UL — HIGH (ref 3.8–10.5)
WBC UR QL: SIGNIFICANT CHANGE UP

## 2020-11-30 PROCEDURE — 74177 CT ABD & PELVIS W/CONTRAST: CPT | Mod: 26,MA

## 2020-11-30 PROCEDURE — 71275 CT ANGIOGRAPHY CHEST: CPT | Mod: 26,MA

## 2020-11-30 RX ORDER — OXYCODONE AND ACETAMINOPHEN 5; 325 MG/1; MG/1
1 TABLET ORAL ONCE
Refills: 0 | Status: DISCONTINUED | OUTPATIENT
Start: 2020-11-30 | End: 2020-11-30

## 2020-11-30 RX ADMIN — OXYCODONE AND ACETAMINOPHEN 1 TABLET(S): 5; 325 TABLET ORAL at 04:23

## 2020-11-30 RX ADMIN — MORPHINE SULFATE 4 MILLIGRAM(S): 50 CAPSULE, EXTENDED RELEASE ORAL at 00:00

## 2020-11-30 RX ADMIN — SODIUM CHLORIDE 1000 MILLILITER(S): 9 INJECTION INTRAMUSCULAR; INTRAVENOUS; SUBCUTANEOUS at 01:47

## 2020-11-30 RX ADMIN — OXYCODONE AND ACETAMINOPHEN 1 TABLET(S): 5; 325 TABLET ORAL at 03:53

## 2020-11-30 NOTE — ED ADULT NURSE REASSESSMENT NOTE - NS ED NURSE REASSESS COMMENT FT1
pt resting in bed A with lights off. on cardiac monitor at bedside. awaiting lab results. awaiting CT. endorsed partial relief to pain after pain meds. Dr. Canchola made aware pt resting in bed A with lights off. on cardiac monitor at bedside. IVF infusing. awaiting lab results. awaiting CT. endorsed partial relief to pain after pain meds. Dr. Canchola made aware

## 2020-12-01 LAB
CULTURE RESULTS: SIGNIFICANT CHANGE UP
SPECIMEN SOURCE: SIGNIFICANT CHANGE UP

## 2021-01-21 ENCOUNTER — INPATIENT (INPATIENT)
Facility: HOSPITAL | Age: 76
LOS: 5 days | Discharge: HOSPICE | End: 2021-01-27
Attending: INTERNAL MEDICINE | Admitting: INTERNAL MEDICINE
Payer: MEDICARE

## 2021-01-21 VITALS
OXYGEN SATURATION: 99 % | DIASTOLIC BLOOD PRESSURE: 91 MMHG | SYSTOLIC BLOOD PRESSURE: 127 MMHG | HEIGHT: 66 IN | RESPIRATION RATE: 20 BRPM | WEIGHT: 214.95 LBS | TEMPERATURE: 98 F | HEART RATE: 120 BPM

## 2021-01-21 DIAGNOSIS — C50.911 MALIGNANT NEOPLASM OF UNSPECIFIED SITE OF RIGHT FEMALE BREAST: ICD-10-CM

## 2021-01-21 DIAGNOSIS — J18.9 PNEUMONIA, UNSPECIFIED ORGANISM: ICD-10-CM

## 2021-01-21 DIAGNOSIS — E89.89 OTHER POSTPROCEDURAL ENDOCRINE AND METABOLIC COMPLICATIONS AND DISORDERS: Chronic | ICD-10-CM

## 2021-01-21 DIAGNOSIS — E11.9 TYPE 2 DIABETES MELLITUS WITHOUT COMPLICATIONS: ICD-10-CM

## 2021-01-21 DIAGNOSIS — I10 ESSENTIAL (PRIMARY) HYPERTENSION: ICD-10-CM

## 2021-01-21 LAB
ALBUMIN SERPL ELPH-MCNC: 2.3 G/DL — LOW (ref 3.3–5)
ALP SERPL-CCNC: 601 U/L — HIGH (ref 40–120)
ALT FLD-CCNC: 208 U/L — HIGH (ref 12–78)
ANION GAP SERPL CALC-SCNC: 8 MMOL/L — SIGNIFICANT CHANGE UP (ref 5–17)
APPEARANCE UR: ABNORMAL
APTT BLD: 34 SEC — SIGNIFICANT CHANGE UP (ref 27.5–35.5)
AST SERPL-CCNC: 468 U/L — HIGH (ref 15–37)
BACTERIA # UR AUTO: ABNORMAL
BASOPHILS # BLD AUTO: 0.07 K/UL — SIGNIFICANT CHANGE UP (ref 0–0.2)
BASOPHILS NFR BLD AUTO: 0.6 % — SIGNIFICANT CHANGE UP (ref 0–2)
BILIRUB SERPL-MCNC: 4.9 MG/DL — HIGH (ref 0.2–1.2)
BILIRUB UR-MCNC: ABNORMAL
BUN SERPL-MCNC: 18 MG/DL — SIGNIFICANT CHANGE UP (ref 7–23)
CALCIUM SERPL-MCNC: 8.8 MG/DL — SIGNIFICANT CHANGE UP (ref 8.5–10.1)
CHLORIDE SERPL-SCNC: 99 MMOL/L — SIGNIFICANT CHANGE UP (ref 96–108)
CK MB BLD-MCNC: <0.2 % — SIGNIFICANT CHANGE UP (ref 0–3.5)
CK MB CFR SERPL CALC: <1 NG/ML — SIGNIFICANT CHANGE UP (ref 0.5–3.6)
CK SERPL-CCNC: 606 U/L — HIGH (ref 26–192)
CO2 SERPL-SCNC: 25 MMOL/L — SIGNIFICANT CHANGE UP (ref 22–31)
COLOR SPEC: YELLOW — SIGNIFICANT CHANGE UP
CREAT SERPL-MCNC: 1.24 MG/DL — SIGNIFICANT CHANGE UP (ref 0.5–1.3)
DIFF PNL FLD: ABNORMAL
EOSINOPHIL # BLD AUTO: 0.05 K/UL — SIGNIFICANT CHANGE UP (ref 0–0.5)
EOSINOPHIL NFR BLD AUTO: 0.5 % — SIGNIFICANT CHANGE UP (ref 0–6)
EPI CELLS # UR: SIGNIFICANT CHANGE UP
GLUCOSE BLDC GLUCOMTR-MCNC: 212 MG/DL — HIGH (ref 70–99)
GLUCOSE SERPL-MCNC: 138 MG/DL — HIGH (ref 70–99)
GLUCOSE UR QL: NEGATIVE MG/DL — SIGNIFICANT CHANGE UP
HCT VFR BLD CALC: 34.5 % — SIGNIFICANT CHANGE UP (ref 34.5–45)
HGB BLD-MCNC: 11.1 G/DL — LOW (ref 11.5–15.5)
IMM GRANULOCYTES NFR BLD AUTO: 0.5 % — SIGNIFICANT CHANGE UP (ref 0–1.5)
INR BLD: 1.25 RATIO — HIGH (ref 0.88–1.16)
KETONES UR-MCNC: ABNORMAL
LACTATE SERPL-SCNC: 3.2 MMOL/L — HIGH (ref 0.7–2)
LEUKOCYTE ESTERASE UR-ACNC: ABNORMAL
LYMPHOCYTES # BLD AUTO: 19.8 % — SIGNIFICANT CHANGE UP (ref 13–44)
LYMPHOCYTES # BLD AUTO: 2.16 K/UL — SIGNIFICANT CHANGE UP (ref 1–3.3)
MCHC RBC-ENTMCNC: 24.4 PG — LOW (ref 27–34)
MCHC RBC-ENTMCNC: 32.2 GM/DL — SIGNIFICANT CHANGE UP (ref 32–36)
MCV RBC AUTO: 75.8 FL — LOW (ref 80–100)
MONOCYTES # BLD AUTO: 0.72 K/UL — SIGNIFICANT CHANGE UP (ref 0–0.9)
MONOCYTES NFR BLD AUTO: 6.6 % — SIGNIFICANT CHANGE UP (ref 2–14)
NEUTROPHILS # BLD AUTO: 7.88 K/UL — HIGH (ref 1.8–7.4)
NEUTROPHILS NFR BLD AUTO: 72 % — SIGNIFICANT CHANGE UP (ref 43–77)
NITRITE UR-MCNC: POSITIVE
NRBC # BLD: 0 /100 WBCS — SIGNIFICANT CHANGE UP (ref 0–0)
PH UR: 5 — SIGNIFICANT CHANGE UP (ref 5–8)
PLATELET # BLD AUTO: 265 K/UL — SIGNIFICANT CHANGE UP (ref 150–400)
POTASSIUM SERPL-MCNC: 5.3 MMOL/L — SIGNIFICANT CHANGE UP (ref 3.5–5.3)
POTASSIUM SERPL-SCNC: 5.3 MMOL/L — SIGNIFICANT CHANGE UP (ref 3.5–5.3)
PROT SERPL-MCNC: 7.7 GM/DL — SIGNIFICANT CHANGE UP (ref 6–8.3)
PROT UR-MCNC: 100 MG/DL
PROTHROM AB SERPL-ACNC: 14.4 SEC — HIGH (ref 10.6–13.6)
RAPID RVP RESULT: SIGNIFICANT CHANGE UP
RBC # BLD: 4.55 M/UL — SIGNIFICANT CHANGE UP (ref 3.8–5.2)
RBC # FLD: 17.8 % — HIGH (ref 10.3–14.5)
RBC CASTS # UR COMP ASSIST: SIGNIFICANT CHANGE UP /HPF (ref 0–4)
SARS-COV-2 RNA SPEC QL NAA+PROBE: SIGNIFICANT CHANGE UP
SODIUM SERPL-SCNC: 132 MMOL/L — LOW (ref 135–145)
SP GR SPEC: 1.01 — SIGNIFICANT CHANGE UP (ref 1.01–1.02)
TROPONIN I SERPL-MCNC: <.015 NG/ML — SIGNIFICANT CHANGE UP (ref 0.01–0.04)
UROBILINOGEN FLD QL: 8 MG/DL
WBC # BLD: 10.93 K/UL — HIGH (ref 3.8–10.5)
WBC # FLD AUTO: 10.93 K/UL — HIGH (ref 3.8–10.5)
WBC UR QL: ABNORMAL

## 2021-01-21 PROCEDURE — 99285 EMERGENCY DEPT VISIT HI MDM: CPT

## 2021-01-21 PROCEDURE — 70450 CT HEAD/BRAIN W/O DYE: CPT | Mod: 26,MA

## 2021-01-21 PROCEDURE — 74177 CT ABD & PELVIS W/CONTRAST: CPT | Mod: 26,MA

## 2021-01-21 PROCEDURE — 93010 ELECTROCARDIOGRAM REPORT: CPT

## 2021-01-21 PROCEDURE — 71275 CT ANGIOGRAPHY CHEST: CPT | Mod: 26,MA

## 2021-01-21 PROCEDURE — 99221 1ST HOSP IP/OBS SF/LOW 40: CPT | Mod: AI

## 2021-01-21 PROCEDURE — 93970 EXTREMITY STUDY: CPT | Mod: 26

## 2021-01-21 PROCEDURE — 71045 X-RAY EXAM CHEST 1 VIEW: CPT | Mod: 26

## 2021-01-21 RX ORDER — ONDANSETRON 8 MG/1
4 TABLET, FILM COATED ORAL ONCE
Refills: 0 | Status: COMPLETED | OUTPATIENT
Start: 2021-01-21 | End: 2021-01-21

## 2021-01-21 RX ORDER — INSULIN GLARGINE 100 [IU]/ML
12 INJECTION, SOLUTION SUBCUTANEOUS AT BEDTIME
Refills: 0 | Status: DISCONTINUED | OUTPATIENT
Start: 2021-01-21 | End: 2021-01-27

## 2021-01-21 RX ORDER — SODIUM CHLORIDE 9 MG/ML
1000 INJECTION INTRAMUSCULAR; INTRAVENOUS; SUBCUTANEOUS ONCE
Refills: 0 | Status: COMPLETED | OUTPATIENT
Start: 2021-01-21 | End: 2021-01-21

## 2021-01-21 RX ORDER — INSULIN LISPRO 100/ML
VIAL (ML) SUBCUTANEOUS
Refills: 0 | Status: DISCONTINUED | OUTPATIENT
Start: 2021-01-21 | End: 2021-01-27

## 2021-01-21 RX ORDER — SODIUM CHLORIDE 9 MG/ML
1950 INJECTION INTRAMUSCULAR; INTRAVENOUS; SUBCUTANEOUS ONCE
Refills: 0 | Status: COMPLETED | OUTPATIENT
Start: 2021-01-21 | End: 2021-01-21

## 2021-01-21 RX ORDER — VANCOMYCIN HCL 1 G
1000 VIAL (EA) INTRAVENOUS ONCE
Refills: 0 | Status: COMPLETED | OUTPATIENT
Start: 2021-01-21 | End: 2021-01-21

## 2021-01-21 RX ORDER — DULOXETINE HYDROCHLORIDE 30 MG/1
20 CAPSULE, DELAYED RELEASE ORAL
Refills: 0 | Status: DISCONTINUED | OUTPATIENT
Start: 2021-01-21 | End: 2021-01-27

## 2021-01-21 RX ORDER — INSULIN LISPRO 100/ML
VIAL (ML) SUBCUTANEOUS AT BEDTIME
Refills: 0 | Status: DISCONTINUED | OUTPATIENT
Start: 2021-01-21 | End: 2021-01-27

## 2021-01-21 RX ORDER — BACLOFEN 100 %
5 POWDER (GRAM) MISCELLANEOUS
Refills: 0 | Status: DISCONTINUED | OUTPATIENT
Start: 2021-01-21 | End: 2021-01-27

## 2021-01-21 RX ORDER — SODIUM CHLORIDE 9 MG/ML
1000 INJECTION, SOLUTION INTRAVENOUS
Refills: 0 | Status: DISCONTINUED | OUTPATIENT
Start: 2021-01-21 | End: 2021-01-27

## 2021-01-21 RX ORDER — MECLIZINE HCL 12.5 MG
25 TABLET ORAL THREE TIMES A DAY
Refills: 0 | Status: DISCONTINUED | OUTPATIENT
Start: 2021-01-21 | End: 2021-01-27

## 2021-01-21 RX ORDER — GLUCAGON INJECTION, SOLUTION 0.5 MG/.1ML
1 INJECTION, SOLUTION SUBCUTANEOUS ONCE
Refills: 0 | Status: DISCONTINUED | OUTPATIENT
Start: 2021-01-21 | End: 2021-01-27

## 2021-01-21 RX ORDER — DEXTROSE 50 % IN WATER 50 %
25 SYRINGE (ML) INTRAVENOUS ONCE
Refills: 0 | Status: DISCONTINUED | OUTPATIENT
Start: 2021-01-21 | End: 2021-01-27

## 2021-01-21 RX ORDER — DEXTROSE 50 % IN WATER 50 %
15 SYRINGE (ML) INTRAVENOUS ONCE
Refills: 0 | Status: DISCONTINUED | OUTPATIENT
Start: 2021-01-21 | End: 2021-01-27

## 2021-01-21 RX ORDER — METOPROLOL TARTRATE 50 MG
25 TABLET ORAL
Refills: 0 | Status: DISCONTINUED | OUTPATIENT
Start: 2021-01-21 | End: 2021-01-27

## 2021-01-21 RX ORDER — PIPERACILLIN AND TAZOBACTAM 4; .5 G/20ML; G/20ML
3.38 INJECTION, POWDER, LYOPHILIZED, FOR SOLUTION INTRAVENOUS EVERY 8 HOURS
Refills: 0 | Status: DISCONTINUED | OUTPATIENT
Start: 2021-01-22 | End: 2021-01-22

## 2021-01-21 RX ORDER — MORPHINE SULFATE 50 MG/1
2 CAPSULE, EXTENDED RELEASE ORAL ONCE
Refills: 0 | Status: DISCONTINUED | OUTPATIENT
Start: 2021-01-21 | End: 2021-01-21

## 2021-01-21 RX ORDER — PIPERACILLIN AND TAZOBACTAM 4; .5 G/20ML; G/20ML
3.38 INJECTION, POWDER, LYOPHILIZED, FOR SOLUTION INTRAVENOUS ONCE
Refills: 0 | Status: COMPLETED | OUTPATIENT
Start: 2021-01-21 | End: 2021-01-21

## 2021-01-21 RX ORDER — GABAPENTIN 400 MG/1
100 CAPSULE ORAL DAILY
Refills: 0 | Status: DISCONTINUED | OUTPATIENT
Start: 2021-01-21 | End: 2021-01-27

## 2021-01-21 RX ORDER — TRAMADOL HYDROCHLORIDE 50 MG/1
50 TABLET ORAL EVERY 6 HOURS
Refills: 0 | Status: DISCONTINUED | OUTPATIENT
Start: 2021-01-21 | End: 2021-01-27

## 2021-01-21 RX ORDER — OXYCODONE AND ACETAMINOPHEN 5; 325 MG/1; MG/1
2 TABLET ORAL EVERY 4 HOURS
Refills: 0 | Status: DISCONTINUED | OUTPATIENT
Start: 2021-01-21 | End: 2021-01-22

## 2021-01-21 RX ORDER — DEXTROSE 50 % IN WATER 50 %
12.5 SYRINGE (ML) INTRAVENOUS ONCE
Refills: 0 | Status: DISCONTINUED | OUTPATIENT
Start: 2021-01-21 | End: 2021-01-27

## 2021-01-21 RX ADMIN — OXYCODONE AND ACETAMINOPHEN 2 TABLET(S): 5; 325 TABLET ORAL at 22:30

## 2021-01-21 RX ADMIN — Medication 5 MILLIGRAM(S): at 22:31

## 2021-01-21 RX ADMIN — PIPERACILLIN AND TAZOBACTAM 200 GRAM(S): 4; .5 INJECTION, POWDER, LYOPHILIZED, FOR SOLUTION INTRAVENOUS at 16:59

## 2021-01-21 RX ADMIN — ONDANSETRON 4 MILLIGRAM(S): 8 TABLET, FILM COATED ORAL at 18:53

## 2021-01-21 RX ADMIN — SODIUM CHLORIDE 1950 MILLILITER(S): 9 INJECTION INTRAMUSCULAR; INTRAVENOUS; SUBCUTANEOUS at 19:15

## 2021-01-21 RX ADMIN — TRAMADOL HYDROCHLORIDE 50 MILLIGRAM(S): 50 TABLET ORAL at 22:31

## 2021-01-21 RX ADMIN — Medication 250 MILLIGRAM(S): at 18:54

## 2021-01-21 RX ADMIN — Medication 25 MILLIGRAM(S): at 18:53

## 2021-01-21 RX ADMIN — SODIUM CHLORIDE 975 MILLILITER(S): 9 INJECTION INTRAMUSCULAR; INTRAVENOUS; SUBCUTANEOUS at 17:02

## 2021-01-21 RX ADMIN — SODIUM CHLORIDE 1000 MILLILITER(S): 9 INJECTION INTRAMUSCULAR; INTRAVENOUS; SUBCUTANEOUS at 15:17

## 2021-01-21 RX ADMIN — INSULIN GLARGINE 12 UNIT(S): 100 INJECTION, SOLUTION SUBCUTANEOUS at 22:28

## 2021-01-21 NOTE — ED ADULT NURSE NOTE - OBJECTIVE STATEMENT
pt a&O x3 pt form home, pt uses cane at baseline. pt denies recent falls c/o dizziness "room spinning" sensation x 1 week worse with movement c/o n/v under treatment for r breast cancer lymph nodes removed chemo stopped in september denies headache

## 2021-01-21 NOTE — H&P ADULT - NSHPPHYSICALEXAM_GEN_ALL_CORE
ICU Vital Signs Last 24 Hrs  T(C): 37.4 (21 Jan 2021 15:29), Max: 37.4 (21 Jan 2021 15:29)  T(F): 99.4 (21 Jan 2021 15:29), Max: 99.4 (21 Jan 2021 15:29)  HR: 100 (21 Jan 2021 15:29) (100 - 120)  BP: 115/76 (21 Jan 2021 15:29) (115/76 - 127/91)  BP(mean): --  ABP: --  ABP(mean): --  RR: 18 (21 Jan 2021 15:29) (18 - 20)  SpO2: 96% (21 Jan 2021 15:29) (96% - 99%)  GENERAL: NAD well-developed  HEAD:  Atraumatic, Normocephalic  EYES: EOMI, PERRLA, conjunctiva and sclera clear  ENMT: No tonsillar erythema, exudates, or enlargement; Moist mucous membranes, Good dentition, No lesions  NECK: Supple, No JVD, Normal thyroid  NERVOUS SYSTEM:  Alert & Oriented X3, Good concentration; Motor Strength 5/5 B/L upper and lower extremities; DTRs 2+ intact and symmetric  CHEST/LUNG: Clear to percussion bilaterally; No rales, rhonchi, wheezing, or rubs  HEART: Regular rate and rhythm; No murmurs, rubs, or gallops  ABDOMEN: Soft, Nontender, Nondistended; Bowel sounds present  EXTREMITIES:  2+ Peripheral Pulses, No clubbing, cyanosis, or edema  LYMPH: No lymphadenopathy   SKIN: No rashes or lesions

## 2021-01-21 NOTE — ED PROVIDER NOTE - OBJECTIVE STATEMENT
75 year old female with h/o HTN, DM and R breast cancer (diagnosed in 2015, s/p chemo and radiation, recurrence in 2019, stopped treatment in Sept 2020) with mets to the liver presents today c/o 1 1/2 week h/o dizziness,  unsteadiness and nausea 75 year old female with h/o HTN, DM and R breast cancer (diagnosed in 2015, s/p chemo and radiation, recurrence in 2019, stopped treatment in Sept 2020) with mets to the liver presents today c/o 1 1/2 week h/o dizziness,  unsteadiness and nausea, pt also reports feeling SOB, palpitations and bilateral LE edema, pt denies h/o vertigo, (-) chest pain (-) fevers or chills +mild cough usually at night (-) sick contacts (-) headache (-) visual or speech changes

## 2021-01-21 NOTE — ED ADULT NURSE NOTE - NSIMPLEMENTINTERV_GEN_ALL_ED
Implemented All Fall with Harm Risk Interventions:  [place] to call system. Call bell, personal items and telephone within reach. Instruct patient to call for assistance. Room bathroom lighting operational. Non-slip footwear when patient is off stretcher. Physically safe environment: no spills, clutter or unnecessary equipment. Stretcher in lowest position, wheels locked, appropriate side rails in place. Provide visual cue, wrist band, yellow gown, etc. Monitor gait and stability. Monitor for mental status changes and reorient to person, place, and time. Review medications for side effects contributing to fall risk. Reinforce activity limits and safety measures with patient and family. Provide visual clues: red socks.

## 2021-01-21 NOTE — ED PROVIDER NOTE - CLINICAL SUMMARY MEDICAL DECISION MAKING FREE TEXT BOX
pt presented today c/o dizziness, unsteadiness, sob and jaudice, pt has h/o right sided breast cancer with liver mets, ha pt presented today c/o dizziness, unsteadiness, sob, poor po and jaundice, pt has h/o right sided breast cancer with liver mets, chest xray today shows pneumonia pt admitted for hydration and iv antibiotics

## 2021-01-21 NOTE — H&P ADULT - NSICDXPASTMEDICALHX_GEN_ALL_CORE_FT
PAST MEDICAL HISTORY:  Breast cancer     Diabetes     Diabetes mellitus     Essential hypertension     Hypertension

## 2021-01-21 NOTE — H&P ADULT - ASSESSMENT
75 years old female with history of breast cancer with jaundice with short of breath with infiltrate    IMPROVE VTE Individual Risk Assessment          RISK                                                          Points  [  ] Previous VTE                                                3  [  ] Thrombophilia                                             2  [  ] Lower limb paralysis                                   2        (unable to hold up >15 seconds)    [  ] Current Cancer                                             2         (within 6 months)  [  ] Immobilization > 24 hrs                              1  [  ] ICU/CCU stay > 24 hours                             1  [  ] Age > 60                                                         1    IMPROVE VTE Score: 2

## 2021-01-21 NOTE — H&P ADULT - NSHPREVIEWOFSYSTEMS_GEN_ALL_CORE
CONSTITUTIONAL: No fever, weight loss, or fatigue  EYES: No eye pain, visual disturbances, or discharge  ENMT:  No difficulty hearing, tinnitus, vertigo; No sinus or throat pain  NECK: No pain or stiffness  RESPIRATORY: short of breath   CARDIOVASCULAR: No chest pain, palpitations, dizziness, or leg swelling  GASTROINTESTINAL: se history of present illness   GENITOURINARY: No dysuria, frequency, hematuria, or incontinence  NEUROLOGICAL: No headaches, memory loss, loss of strength, numbness, or tremors  SKIN: No itching, burning, rashes, or lesions   LYMPH NODES: No enlarged glands  ENDOCRINE: No heat or cold intolerance; No hair loss  MUSCULOSKELETAL: No joint pain or swelling; No muscle, back, or extremity pain  PSYCHIATRIC: No depression, anxiety, mood swings, or difficulty sleeping  HEME/LYMPH: No easy bruising, or bleeding gums  ALLERGY AND IMMUNOLOGIC: No hives or eczema

## 2021-01-21 NOTE — ED PROVIDER NOTE - CARE PLAN
Principal Discharge DX:	Pneumonia  Secondary Diagnosis:	Carcinoma of right breast metastatic to liver

## 2021-01-21 NOTE — ED ADULT TRIAGE NOTE - CHIEF COMPLAINT QUOTE
c/o dizziness "room spinning" sensation x 1 week worse with movement c/o n/v under treatment for r breast cancer lymph nodes removed chemo stopped in september denies headache

## 2021-01-21 NOTE — H&P ADULT - HISTORY OF PRESENT ILLNESS
75 year old female with h/o HTN, DM and R breast cancer (diagnosed in 2015, s/p chemo and radiation, recurrence in 2019, stopped treatment in Sept 2020) with mets to the liver presents today c/o 1 1/2 week h/o dizziness,  unsteadiness and nausea, pt also reports feeling SOB, palpitations and bilateral LE edema, pt denies h/o vertigo, (-) chest pain (-) fevers or chills +mild cough usually at night (-) sick contacts (-) headache (-) visual or speech changes

## 2021-01-21 NOTE — ED ADULT NURSE NOTE - NURSING NEURO LEVEL OF CONSCIOUSNESS
-- Message is from the [facility]--    Caller is requesting an appointment - at a sooner time than what was available.  They declined scheduling with a trusted partner or sister site.    Reason for Visit: Patient is requesting call back as soon as possible regarding concerns of her MRI results. She was advised that there was concerns of her ovaries and needs to follow up with Dr Devaughn Saucedo MD. Unable to find appointment and KB mentioned that doctor is out of office until mid December. Patient is requesting to be seen by today 12/13/2018 if even possible. Please check and advise.    Is the patient currently scheduled? No    Caller Information       Type Contact Phone    12/13/2018 08:30 AM Phone (Incoming) Denny Robles (Self) [phone] (H)          Alternative phone number: n/a    Turnaround time given to caller:   \"This message will be sent to [state Provider's name]. The clinical team will fulfill your request as soon as they review your message.\"   alert and awake

## 2021-01-22 DIAGNOSIS — A41.9 SEPSIS, UNSPECIFIED ORGANISM: ICD-10-CM

## 2021-01-22 DIAGNOSIS — R42 DIZZINESS AND GIDDINESS: ICD-10-CM

## 2021-01-22 DIAGNOSIS — N17.9 ACUTE KIDNEY FAILURE, UNSPECIFIED: ICD-10-CM

## 2021-01-22 LAB
A1C WITH ESTIMATED AVERAGE GLUCOSE RESULT: 8 % — HIGH (ref 4–5.6)
ESTIMATED AVERAGE GLUCOSE: 183 MG/DL — HIGH (ref 68–114)
GLUCOSE BLDC GLUCOMTR-MCNC: 130 MG/DL — HIGH (ref 70–99)
GLUCOSE BLDC GLUCOMTR-MCNC: 133 MG/DL — HIGH (ref 70–99)
GLUCOSE BLDC GLUCOMTR-MCNC: 143 MG/DL — HIGH (ref 70–99)
GLUCOSE BLDC GLUCOMTR-MCNC: 153 MG/DL — HIGH (ref 70–99)
HCV AB S/CO SERPL IA: 0.13 S/CO — SIGNIFICANT CHANGE UP (ref 0–0.99)
HCV AB SERPL-IMP: SIGNIFICANT CHANGE UP
SARS-COV-2 IGG SERPL QL IA: NEGATIVE — SIGNIFICANT CHANGE UP
SARS-COV-2 IGM SERPL IA-ACNC: <0.1 INDEX — SIGNIFICANT CHANGE UP

## 2021-01-22 PROCEDURE — 99233 SBSQ HOSP IP/OBS HIGH 50: CPT

## 2021-01-22 PROCEDURE — 99223 1ST HOSP IP/OBS HIGH 75: CPT

## 2021-01-22 RX ORDER — ONDANSETRON 8 MG/1
4 TABLET, FILM COATED ORAL ONCE
Refills: 0 | Status: COMPLETED | OUTPATIENT
Start: 2021-01-22 | End: 2021-01-22

## 2021-01-22 RX ORDER — MORPHINE SULFATE 50 MG/1
2 CAPSULE, EXTENDED RELEASE ORAL EVERY 6 HOURS
Refills: 0 | Status: DISCONTINUED | OUTPATIENT
Start: 2021-01-22 | End: 2021-01-27

## 2021-01-22 RX ORDER — ONDANSETRON 8 MG/1
4 TABLET, FILM COATED ORAL EVERY 6 HOURS
Refills: 0 | Status: DISCONTINUED | OUTPATIENT
Start: 2021-01-22 | End: 2021-01-27

## 2021-01-22 RX ORDER — HEPARIN SODIUM 5000 [USP'U]/ML
5000 INJECTION INTRAVENOUS; SUBCUTANEOUS EVERY 12 HOURS
Refills: 0 | Status: DISCONTINUED | OUTPATIENT
Start: 2021-01-22 | End: 2021-01-27

## 2021-01-22 RX ORDER — OXYCODONE HYDROCHLORIDE 5 MG/1
5 TABLET ORAL EVERY 6 HOURS
Refills: 0 | Status: DISCONTINUED | OUTPATIENT
Start: 2021-01-22 | End: 2021-01-27

## 2021-01-22 RX ORDER — INFLUENZA VIRUS VACCINE 15; 15; 15; 15 UG/.5ML; UG/.5ML; UG/.5ML; UG/.5ML
0.5 SUSPENSION INTRAMUSCULAR ONCE
Refills: 0 | Status: DISCONTINUED | OUTPATIENT
Start: 2021-01-22 | End: 2021-01-27

## 2021-01-22 RX ORDER — POLYETHYLENE GLYCOL 3350 17 G/17G
17 POWDER, FOR SOLUTION ORAL DAILY
Refills: 0 | Status: DISCONTINUED | OUTPATIENT
Start: 2021-01-22 | End: 2021-01-27

## 2021-01-22 RX ORDER — SODIUM CHLORIDE 9 MG/ML
1000 INJECTION INTRAMUSCULAR; INTRAVENOUS; SUBCUTANEOUS
Refills: 0 | Status: COMPLETED | OUTPATIENT
Start: 2021-01-22 | End: 2021-01-22

## 2021-01-22 RX ORDER — IBUPROFEN 200 MG
400 TABLET ORAL EVERY 8 HOURS
Refills: 0 | Status: DISCONTINUED | OUTPATIENT
Start: 2021-01-22 | End: 2021-01-27

## 2021-01-22 RX ADMIN — ONDANSETRON 4 MILLIGRAM(S): 8 TABLET, FILM COATED ORAL at 11:02

## 2021-01-22 RX ADMIN — SODIUM CHLORIDE 60 MILLILITER(S): 9 INJECTION INTRAMUSCULAR; INTRAVENOUS; SUBCUTANEOUS at 17:11

## 2021-01-22 RX ADMIN — TRAMADOL HYDROCHLORIDE 50 MILLIGRAM(S): 50 TABLET ORAL at 23:27

## 2021-01-22 RX ADMIN — INSULIN GLARGINE 12 UNIT(S): 100 INJECTION, SOLUTION SUBCUTANEOUS at 23:27

## 2021-01-22 RX ADMIN — Medication 1: at 08:09

## 2021-01-22 RX ADMIN — PIPERACILLIN AND TAZOBACTAM 25 GRAM(S): 4; .5 INJECTION, POWDER, LYOPHILIZED, FOR SOLUTION INTRAVENOUS at 08:09

## 2021-01-22 RX ADMIN — Medication 25 MILLIGRAM(S): at 06:17

## 2021-01-22 RX ADMIN — PIPERACILLIN AND TAZOBACTAM 25 GRAM(S): 4; .5 INJECTION, POWDER, LYOPHILIZED, FOR SOLUTION INTRAVENOUS at 01:57

## 2021-01-22 RX ADMIN — DULOXETINE HYDROCHLORIDE 20 MILLIGRAM(S): 30 CAPSULE, DELAYED RELEASE ORAL at 06:17

## 2021-01-22 RX ADMIN — DULOXETINE HYDROCHLORIDE 20 MILLIGRAM(S): 30 CAPSULE, DELAYED RELEASE ORAL at 17:12

## 2021-01-22 RX ADMIN — Medication 25 MILLIGRAM(S): at 13:09

## 2021-01-22 RX ADMIN — HEPARIN SODIUM 5000 UNIT(S): 5000 INJECTION INTRAVENOUS; SUBCUTANEOUS at 17:12

## 2021-01-22 RX ADMIN — GABAPENTIN 100 MILLIGRAM(S): 400 CAPSULE ORAL at 11:02

## 2021-01-22 RX ADMIN — Medication 25 MILLIGRAM(S): at 17:12

## 2021-01-22 RX ADMIN — Medication 25 MILLIGRAM(S): at 23:27

## 2021-01-22 NOTE — CONSULT NOTE ADULT - SUBJECTIVE AND OBJECTIVE BOX
[facility] [place] [facility]  Division of Infectious Diseases  [phone]    HUGH WILLIAM  75y, Female  [id number]    HPI--      PMH/PSH--  Hypertension    Diabetes    Breast cancer    Essential hypertension    Diabetes mellitus    Post-lymphadenectomy lymphedema of arm        Allergies--  No Known Allergies      Medications--  Antibiotics: piperacillin/tazobactam IVPB.. 3.375 Gram(s) IV Intermittent every 8 hours    Immunologic: influenza   Vaccine 0.5 milliLiter(s) IntraMuscular once    Other: baclofen PRN  dextrose 40% Gel  dextrose 5%.  dextrose 5%.  dextrose 50% Injectable  dextrose 50% Injectable  dextrose 50% Injectable  DULoxetine  gabapentin  glucagon  Injectable  heparin   Injectable  insulin glargine Injectable (LANTUS)  insulin lispro (ADMELOG) corrective regimen sliding scale  insulin lispro (ADMELOG) corrective regimen sliding scale  meclizine  metoprolol tartrate  oxycodone    5 mG/acetaminophen 325 mG PRN  traMADol PRN    Antimicrobials last 90 days per EMR: MEDICATIONS  (STANDING):  piperacillin/tazobactam IVPB..   25 mL/Hr IV Intermittent (01-22-21 @ 08:09)   25 mL/Hr IV Intermittent (01-22-21 @ 01:57)    piperacillin/tazobactam IVPB...   200 mL/Hr IV Intermittent (01-21-21 @ 16:59)    vancomycin  IVPB.   250 mL/Hr IV Intermittent (01-21-21 @ 18:54)        Social History--  EtOH: denies   Tobacco: denies   Drug Use: denies     Family/Marital History--  No pertinent family history in first degree relatives          Travel/Environmental/Occupational History:      Review of Systems:  A >=10-point review of systems was obtained.     Pertinent positives and negatives--  Constitutional: No fevers. No Chills. No Rigors.   Eyes:  ENMT:  Cardiovascular: No chest pain. No palpitations.  Respiratory: No shortness of breath. No cough.  Gastrointestinal: No nausea or vomiting. No diarrhea or constipation.   Genitourinary:  Musculoskeletal:  Skin:  Neurologic:  Psychiatric: Pleasant. Appropriate affect.  Endocrine:  Heme/Lymphatic:  Allergy/Immunologic:    Review of systems otherwise negative except as previously noted.    Physical Exam--  Vital Signs: T(F): 97.5 (01-22-21 @ 11:08), Max: 99.4 (01-21-21 @ 15:29)  HR: 84 (01-22-21 @ 11:08)  BP: 104/70 (01-22-21 @ 11:08)  RR: 17 (01-22-21 @ 11:08)  SpO2: 96% (01-22-21 @ 11:08)  Wt(kg): --  General: Nontoxic-appearing Female in no acute distress.  HEENT: AT/NC. PERRL. EOMI. Anicteric. Conjunctiva pink and moist. Oropharynx clear. Dentition fair.  Neck: Not rigid. No sense of mass.  Nodes: None palpable.  Lungs: Clear bilaterally without rales, wheezing or rhonchi  Heart: Regular rate and rhythm. No Murmur. No rub. No gallop. No palpable thrill.  Abdomen: Bowel sounds present and normoactive. Soft. Nondistended. Nontender. No sense of mass. No organomegaly.  Back: No spinal tenderness. No costovertebral angle tenderness.   Extremities: No cyanosis or clubbing. No edema.   Skin: Warm. Dry. Good turgor. No rash. No vasculitic stigmata.  Psychiatric: Appropriate affect and mood for situation.         Laboratory & Imaging Data--  CBC                        11.1   10.93 )-----------( 265      ( 21 Jan 2021 12:26 )             34.5       Chemistries  01-21    132<L>  |  99  |  18  ----------------------------<  138<H>  5.3   |  25  |  1.24    Ca    8.8      21 Jan 2021 12:26    TPro  7.7  /  Alb  2.3<L>  /  TBili  4.9<H>  /  DBili  x   /  AST  468<H>  /  ALT  208<H>  /  AlkPhos  601<H>  01-21      Culture Data       [facility]  Division of Infectious Diseases  [phone]    HUGH WILLIAM  75y, Female  [id number]    HPI--  75F with metastatic breast cancer to liver. lung; HTN, DM2, states came to the ER secondary to pain. Denies SOB though intital H&P states she did have some. Patient denies any fevers, chills, or rigors. No cough. No definite CP but difficult at times what is pain from her liver disease vs. other source. No hemoptysis. Patient notes generalized edema worst in LE but also increasing abdominal distention. Patient here given IV zosyn for question of pneumonia. Patient presently on RA completely comfortable. CT Chest without convincing changes for pneumonia.     PMH/PSH--  Hypertension    Diabetes    Breast cancer    Essential hypertension    Diabetes mellitus    Post-lymphadenectomy lymphedema of arm        Allergies--  No Known Allergies      Medications--  Antibiotics: piperacillin/tazobactam IVPB.. 3.375 Gram(s) IV Intermittent every 8 hours    Immunologic: influenza   Vaccine 0.5 milliLiter(s) IntraMuscular once    Other: baclofen PRN  dextrose 40% Gel  dextrose 5%.  dextrose 5%.  dextrose 50% Injectable  dextrose 50% Injectable  dextrose 50% Injectable  DULoxetine  gabapentin  glucagon  Injectable  heparin   Injectable  insulin glargine Injectable (LANTUS)  insulin lispro (ADMELOG) corrective regimen sliding scale  insulin lispro (ADMELOG) corrective regimen sliding scale  meclizine  metoprolol tartrate  oxycodone    5 mG/acetaminophen 325 mG PRN  traMADol PRN    Antimicrobials last 90 days per EMR: MEDICATIONS  (STANDING):  piperacillin/tazobactam IVPB..   25 mL/Hr IV Intermittent (01-22-21 @ 08:09)   25 mL/Hr IV Intermittent (01-22-21 @ 01:57)    piperacillin/tazobactam IVPB...   200 mL/Hr IV Intermittent (01-21-21 @ 16:59)    vancomycin  IVPB.   250 mL/Hr IV Intermittent (01-21-21 @ 18:54)      Social History--  EtOH: denies   Tobacco: denies   Drug Use: denies     Family/Marital History--  No pertinent family history in first degree relatives      Review of Systems:  A >=10-point review of systems was obtained.   Review of systems otherwise negative except as previously noted.    Physical Exam--  Vital Signs: T(F): 97.5 (01-22-21 @ 11:08), Max: 99.4 (01-21-21 @ 15:29)  HR: 84 (01-22-21 @ 11:08)  BP: 104/70 (01-22-21 @ 11:08)  RR: 17 (01-22-21 @ 11:08)  SpO2: 96% (01-22-21 @ 11:08)  Wt(kg): --  General: Nontoxic-appearing Female in no acute distress.  HEENT: AT/NC. PERRL. EOMI. Anicteric. Conjunctiva pink and moist. Oropharynx clear.  Neck: Not rigid. No sense of mass.  Nodes: None palpable.  Lungs: Diminished breath sounds bilaterally without rales, wheezing or rhonchi  Heart: Regular rate and rhythm. No Murmur. No rub. No gallop. No palpable thrill.  Abdomen: Bowel sounds present and normoactive. Soft. Nondistended. Nontender except over liver, which is prominent and rock-hard.  No other sense of mass. No organomegaly. Obese.  Back: No spinal tenderness. No costovertebral angle tenderness.   Extremities: No cyanosis or clubbing. 3+ LE edema.   Skin: Warm. Dry. Good turgor. No rash. No vasculitic stigmata.  Psychiatric: Appropriate affect and mood for situation.       Laboratory & Imaging Data--  CBC                        11.1   10.93 )-----------( 265      ( 21 Jan 2021 12:26 )             34.5       Chemistries  01-21    132<L>  |  99  |  18  ----------------------------<  138<H>  5.3   |  25  |  1.24    Ca    8.8      21 Jan 2021 12:26    TPro  7.7  /  Alb  2.3<L>  /  TBili  4.9<H>  /  DBili  x   /  AST  468<H>  /  ALT  208<H>  /  AlkPhos  601<H>  01-21    Urinalysis (01.21.21 @ 17:09)    pH Urine: 5.0    Glucose Qualitative, Urine: Negative mg/dL    Blood, Urine: Trace    Color: Yellow    Urine Appearance: Slightly Turbid    Bilirubin: Moderate    Ketone - Urine: Trace    Specific Gravity: 1.010    Protein, Urine: 100 mg/dL    Urobilinogen: 8 mg/dL    Nitrite: Positive    Leukocyte Esterase Concentration: Moderate  Urine Microscopic-Add On (NC) (01.21.21 @ 17:09)    Bacteria: Moderate    Epithelial Cells: Few    Red Blood Cell - Urine: 0-2 /HPF    White Blood Cell - Urine: 6-10        Culture Data  None as of yet    < from: CT Angio Chest w/ IV Cont (01.21.21 @ 14:16) >  EXAM:  CT ABDOMEN AND PELVIS IC                        EXAM:  CT ANGIO CHEST (W)AW IC                        PROCEDURE DATE:  01/21/2021    INTERPRETATION:  CLINICAL INDICATION: 75 years  Female with palpitations, sob    r/o PE. Abdominal distention. Jaundice. History of breast cancer.  COMPARISON: 11/30/2020  PROCEDURE:  CT Angiography of the Chest was performed followed by portal venous phase imaging of the Abdomen and Pelvis.  IV Contrast: 95 ml of Omnipaque 350 was injected intravenously. 5 ml were discarded.  Oral contrast: None.  Sagittal and coronal reformats were performed as well as 3D (MIP) reconstructions.    FINDINGS:  CHEST:  LUNGS AND LARGE AIRWAYS: Patent central airways.  1.4 x 1.5 cm left apical lung nodule,larger than prior (2:27).  1.3 x 0.9 cm right posterior apical nodule, larger than prior.  New 6 mm right apical anterior nodule (2:27).  Lobulated left upper pole 5 mm nodule unchanged (2:31).  Right lower lobe passive atelectasis. Bilateral groundglass opacities secondary to low-grade pneumonia or pulmonary edema.  PLEURA: New small right pleural effusion. No left pleural effusion.  VESSELS: No evidence of pulmonary embolism. No aortic aneurysm or dissection.  HEART: Mild cardiomegaly. No pericardial effusion.  MEDIASTINUM AND FRIDA: No lymphadenopathy.  CHEST WALL AND LOWER NECK: Within normal limits.  ABDOMEN AND PELVIS:  LIVER: Significant progression of hepatic metastases with multiple masses increasing in size and number. Hepatomegaly.  BILE DUCTS: Normal caliber.  GALLBLADDER: Contracted  SPLEEN: Within normal limits.  PANCREAS: Within normal limits.  ADRENALS: 2.7 x 2.0 cm left adrenal nodule, unchanged by my measurements. Normal right adrenal gland.  KIDNEYS/URETERS: Within normal limits.  BLADDER: Within normal limits.  REPRODUCTIVE ORGANS: Hysterectomy.  BOWEL: No bowel obstruction. Appendix is normal.  PERITONEUM: Developing perihepatic and pelvic ascites.  VESSELS: Mildly atherosclerotic aorta. Stable calcified retroperitoneal phleboliths.  RETROPERITONEUM/LYMPH NODES: No lymphadenopathy.  ABDOMINAL WALL: Edema in the right lateral abdominal wall.  BONES: Degenerative changes. No aggressive appearing osseous lesions identified. Grade 1 L4-5 anterolisthesis.    IMPRESSION:  No evidence of pulmonary embolism.  Bilateral lung nodules reidentified. 2 upper pole nodules are larger than prior. The remainder unchanged. Lower pole nodules are obscured by motion artifact.  New right pleural effusion with underlying compressive atelectasis of the right lower lobe.  Groundglass infiltrates may reflect pulmonary edema or pneumonia.  Significant progression of hepatic metastases.  Developing ascites.  Developing edema in the right lateral abdominal wall.    ED MANN MD; Attending Radiologist  This document has been electronically signed. Jan 21 2021  2:58PM    < end of copied text >

## 2021-01-22 NOTE — PROGRESS NOTE ADULT - SUBJECTIVE AND OBJECTIVE BOX
Patient is a 75y old  Female who presents with a chief complaint of short of breath (2021 18:18)      OVERNIGHT EVENTS:      REVIEW OF SYSTEMS: denies chest pain/SOB, diaphoresis, no F/C, cough, dizziness, headache, blurry vision, nausea, vomiting, abdominal pain. Rest unremarkable     MEDICATIONS  (STANDING):  dextrose 40% Gel 15 Gram(s) Oral once  dextrose 5%. 1000 milliLiter(s) (50 mL/Hr) IV Continuous <Continuous>  dextrose 5%. 1000 milliLiter(s) (100 mL/Hr) IV Continuous <Continuous>  dextrose 50% Injectable 25 Gram(s) IV Push once  dextrose 50% Injectable 12.5 Gram(s) IV Push once  dextrose 50% Injectable 25 Gram(s) IV Push once  DULoxetine 20 milliGRAM(s) Oral two times a day  gabapentin 100 milliGRAM(s) Oral daily  glucagon  Injectable 1 milliGRAM(s) IntraMuscular once  influenza   Vaccine 0.5 milliLiter(s) IntraMuscular once  insulin glargine Injectable (LANTUS) 12 Unit(s) SubCutaneous at bedtime  insulin lispro (ADMELOG) corrective regimen sliding scale   SubCutaneous three times a day before meals  insulin lispro (ADMELOG) corrective regimen sliding scale   SubCutaneous at bedtime  meclizine 25 milliGRAM(s) Oral three times a day  metoprolol tartrate 25 milliGRAM(s) Oral two times a day  piperacillin/tazobactam IVPB.. 3.375 Gram(s) IV Intermittent every 8 hours    MEDICATIONS  (PRN):  baclofen 5 milliGRAM(s) Oral two times a day PRN Muscle Spasm  oxycodone    5 mG/acetaminophen 325 mG 2 Tablet(s) Oral every 4 hours PRN Severe Pain (7 - 10)  traMADol 50 milliGRAM(s) Oral every 6 hours PRN Moderate Pain (4 - 6)      Allergies    No Known Allergies    Intolerances        SUBJECTIVE: in bed in NAD, no acute events overnight     T(F): 97.5 (21 @ 11:08), Max: 99.4 (21 @ 15:29)  HR: 84 (21 @ 11:08) (84 - 106)  BP: 104/70 (21 @ 11:08) (104/70 - 142/68)  RR: 17 (21 @ 11:08) (17 - 20)  SpO2: 96% (21 @ 11:08) (94% - 98%)  Wt(kg): --    PHYSICAL EXAM:  GENERAL: NAD, well-groomed, well-developed  HEAD:  Atraumatic, Normocephalic  EYES: EOMI, PERRLA, conjunctiva and sclera clear  ENMT: No tonsillar erythema, exudates, or enlargement; Moist mucous membranes, Good dentition, No lesions  NECK: Supple, No JVD, Normal thyroid  CHEST/LUNG: Clear to  auscultation bilaterally; No rales, rhonchi, wheezing, or rubs  bilaterally  HEART: Regular rate and rhythm; No murmurs, rubs, or gallops  ABDOMEN: Soft, Nontender, Nondistended; Bowel sounds present  EXTREMITIES:  2+ Peripheral Pulses, No clubbing, cyanosis, or edema BL LE  LYMPH: No lymphadenopathy noted  SKIN: No rashes or lesions  NERVOUS SYSTEM:  Alert & Oriented X3, Good concentration; Motor Strength 5/5 B/L upper and lower extremities;   DTRs 2+ intact and symmetric, sensation intact BL    LABS:                        11.1   10.93 )-----------( 265      ( 2021 12:26 )             34.5         132<L>  |  99  |  18  ----------------------------<  138<H>  5.3   |  25  |  1.24    Ca    8.8      2021 12:26    TPro  7.7  /  Alb  2.3<L>  /  TBili  4.9<H>  /  DBili  x   /  AST  468<H>  /  ALT  208<H>  /  AlkPhos  601<H>      PT/INR - ( 2021 12:26 )   PT: 14.4 sec;   INR: 1.25 ratio         PTT - ( 2021 12:26 )  PTT:34.0 sec  Urinalysis Basic - ( 2021 17:09 )    Color: Yellow / Appearance: Slightly Turbid / S.010 / pH: x  Gluc: x / Ketone: Trace  / Bili: Moderate / Urobili: 8 mg/dL   Blood: x / Protein: 100 mg/dL / Nitrite: Positive   Leuk Esterase: Moderate / RBC: 0-2 /HPF / WBC 6-10   Sq Epi: x / Non Sq Epi: Few / Bacteria: Moderate      Cultures;   CAPILLARY BLOOD GLUCOSE      POCT Blood Glucose.: 143 mg/dL (2021 11:00)  POCT Blood Glucose.: 153 mg/dL (2021 07:56)  POCT Blood Glucose.: 212 mg/dL (2021 22:12)    Lipid panel:     CARDIAC MARKERS ( 2021 12:26 )  <.015 ng/mL / x     / 606 U/L / x     / <1.0 ng/mL        RADIOLOGY & ADDITIONAL TESTS:      Imaging Personally Reviewed:  [ x] YES      Consultant(s) Notes Reviewed:  [x ] YES     Care Discussed with [x ] Consultants [X ] Patient [x ] Family  [x ]    [x ]  Other; RN Patient is a 75y old  Female who presents with a chief complaint of short of breath (2021 18:18)      OVERNIGHT EVENTS: admitted yesterday    MEDICATIONS  (STANDING):  dextrose 40% Gel 15 Gram(s) Oral once  dextrose 5%. 1000 milliLiter(s) (50 mL/Hr) IV Continuous <Continuous>  dextrose 5%. 1000 milliLiter(s) (100 mL/Hr) IV Continuous <Continuous>  dextrose 50% Injectable 25 Gram(s) IV Push once  dextrose 50% Injectable 12.5 Gram(s) IV Push once  dextrose 50% Injectable 25 Gram(s) IV Push once  DULoxetine 20 milliGRAM(s) Oral two times a day  gabapentin 100 milliGRAM(s) Oral daily  glucagon  Injectable 1 milliGRAM(s) IntraMuscular once  influenza   Vaccine 0.5 milliLiter(s) IntraMuscular once  insulin glargine Injectable (LANTUS) 12 Unit(s) SubCutaneous at bedtime  insulin lispro (ADMELOG) corrective regimen sliding scale   SubCutaneous three times a day before meals  insulin lispro (ADMELOG) corrective regimen sliding scale   SubCutaneous at bedtime  meclizine 25 milliGRAM(s) Oral three times a day  metoprolol tartrate 25 milliGRAM(s) Oral two times a day  piperacillin/tazobactam IVPB.. 3.375 Gram(s) IV Intermittent every 8 hours    MEDICATIONS  (PRN):  baclofen 5 milliGRAM(s) Oral two times a day PRN Muscle Spasm  oxycodone    5 mG/acetaminophen 325 mG 2 Tablet(s) Oral every 4 hours PRN Severe Pain (7 - 10)  traMADol 50 milliGRAM(s) Oral every 6 hours PRN Moderate Pain (4 - 6)      Allergies    No Known Allergies    Intolerances        SUBJECTIVE: in bed in NAD, no acute events overnight     T(F): 97.5 (21 @ 11:08), Max: 99.4 (21 @ 15:29)  HR: 84 (21 @ 11:08) (84 - 106)  BP: 104/70 (21 @ 11:08) (104/70 - 142/68)  RR: 17 (21 @ 11:08) (17 - 20)  SpO2: 96% (21 @ 11:08) (94% - 98%)  Wt(kg): --    PHYSICAL EXAM:  GENERAL: NAD, well-groomed, well-developed  HEAD:  Atraumatic, Normocephalic  EYES: EOMI, PERRLA, conjunctiva and sclera clear  ENMT: No tonsillar erythema, exudates, or enlargement; Moist mucous membranes, Good dentition, No lesions  NECK: Supple, No JVD, Normal thyroid  CHEST/LUNG: Clear to  auscultation bilaterally; No rales, rhonchi, wheezing, or rubs  bilaterally  HEART: Regular rate and rhythm; No murmurs, rubs, or gallops  ABDOMEN: Soft, Nontender, Nondistended; Bowel sounds present  EXTREMITIES:  2+ Peripheral Pulses, No clubbing, cyanosis, or edema BL LE  LYMPH: No lymphadenopathy noted  SKIN: No rashes or lesions  NERVOUS SYSTEM:  Alert & Oriented X3, Good concentration; Motor Strength 5/5 B/L upper and lower extremities;   DTRs 2+ intact and symmetric, sensation intact BL    LABS:                        11.1   10.93 )-----------( 265      ( 2021 12:26 )             34.5         132<L>  |  99  |  18  ----------------------------<  138<H>  5.3   |  25  |  1.24    Ca    8.8      2021 12:26    TPro  7.7  /  Alb  2.3<L>  /  TBili  4.9<H>  /  DBili  x   /  AST  468<H>  /  ALT  208<H>  /  AlkPhos  601<H>      PT/INR - ( 2021 12:26 )   PT: 14.4 sec;   INR: 1.25 ratio         PTT - ( 2021 12:26 )  PTT:34.0 sec  Urinalysis Basic - ( 2021 17:09 )    Color: Yellow / Appearance: Slightly Turbid / S.010 / pH: x  Gluc: x / Ketone: Trace  / Bili: Moderate / Urobili: 8 mg/dL   Blood: x / Protein: 100 mg/dL / Nitrite: Positive   Leuk Esterase: Moderate / RBC: 0-2 /HPF / WBC 6-10   Sq Epi: x / Non Sq Epi: Few / Bacteria: Moderate      Cultures;   CAPILLARY BLOOD GLUCOSE      POCT Blood Glucose.: 143 mg/dL (2021 11:00)  POCT Blood Glucose.: 153 mg/dL (2021 07:56)  POCT Blood Glucose.: 212 mg/dL (2021 22:12)    Lipid panel:     CARDIAC MARKERS ( 2021 12:26 )  <.015 ng/mL / x     / 606 U/L / x     / <1.0 ng/mL        RADIOLOGY & ADDITIONAL TESTS:      Imaging Personally Reviewed:  [ x] YES      Consultant(s) Notes Reviewed:  [x ] YES     Care Discussed with [x ] Consultants [X ] Patient [x ] Family  [x ]    [x ]  Other; RN Patient is a 75y old  Female who presents with a chief complaint of short of breath (2021 18:18)      OVERNIGHT EVENTS: admitted yesterday    MEDICATIONS  (STANDING):  dextrose 40% Gel 15 Gram(s) Oral once  dextrose 5%. 1000 milliLiter(s) (50 mL/Hr) IV Continuous <Continuous>  dextrose 5%. 1000 milliLiter(s) (100 mL/Hr) IV Continuous <Continuous>  dextrose 50% Injectable 25 Gram(s) IV Push once  dextrose 50% Injectable 12.5 Gram(s) IV Push once  dextrose 50% Injectable 25 Gram(s) IV Push once  DULoxetine 20 milliGRAM(s) Oral two times a day  gabapentin 100 milliGRAM(s) Oral daily  glucagon  Injectable 1 milliGRAM(s) IntraMuscular once  influenza   Vaccine 0.5 milliLiter(s) IntraMuscular once  insulin glargine Injectable (LANTUS) 12 Unit(s) SubCutaneous at bedtime  insulin lispro (ADMELOG) corrective regimen sliding scale   SubCutaneous three times a day before meals  insulin lispro (ADMELOG) corrective regimen sliding scale   SubCutaneous at bedtime  meclizine 25 milliGRAM(s) Oral three times a day  metoprolol tartrate 25 milliGRAM(s) Oral two times a day  piperacillin/tazobactam IVPB.. 3.375 Gram(s) IV Intermittent every 8 hours    MEDICATIONS  (PRN):  baclofen 5 milliGRAM(s) Oral two times a day PRN Muscle Spasm  oxycodone    5 mG/acetaminophen 325 mG 2 Tablet(s) Oral every 4 hours PRN Severe Pain (7 - 10)  traMADol 50 milliGRAM(s) Oral every 6 hours PRN Moderate Pain (4 - 6)      Allergies    No Known Allergies    Intolerances        SUBJECTIVE: in bed in NAD, no acute events overnight     T(F): 97.5 (21 @ 11:08), Max: 99.4 (21 @ 15:29)  HR: 84 (21 @ 11:08) (84 - 106)  BP: 104/70 (21 @ 11:08) (104/70 - 142/68)  RR: 17 (21 @ 11:08) (17 - 20)  SpO2: 96% (21 @ 11:08) (94% - 98%)  Wt(kg): --    PHYSICAL EXAM:  GENERAL: NAD, well-groomed, well-developed  HEAD:  Atraumatic, Normocephalic  EYES: EOMI, PERRLA, conjunctiva and sclera clear  ENMT: No tonsillar erythema, exudates, or enlargement; Moist mucous membranes, Good dentition, No lesions  NECK: Supple, No JVD, Normal thyroid  CHEST/LUNG: Clear to  auscultation bilaterally; No rales, rhonchi, wheezing, or rubs  bilaterally  HEART: Regular rate and rhythm; No murmurs, rubs, or gallops  ABDOMEN: Soft, epigastric tenderness, Nondistended; Bowel sounds present  EXTREMITIES:  2+ Peripheral Pulses, No clubbing, cyanosis, or edema BL LE  SKIN: No rashes or lesions  NERVOUS SYSTEM:  Alert & Oriented X3, Good concentration; Motor Strength 5/5 B/L upper and lower extremities;   DTRs 2+ intact and symmetric, sensation intact BL    LABS:                        11.1   10.93 )-----------( 265      ( 2021 12:26 )             34.5         132<L>  |  99  |  18  ----------------------------<  138<H>  5.3   |  25  |  1.24    Ca    8.8      2021 12:26    TPro  7.7  /  Alb  2.3<L>  /  TBili  4.9<H>  /  DBili  x   /  AST  468<H>  /  ALT  208<H>  /  AlkPhos  601<H>      PT/INR - ( 2021 12:26 )   PT: 14.4 sec;   INR: 1.25 ratio         PTT - ( 2021 12:26 )  PTT:34.0 sec  Urinalysis Basic - ( 2021 17:09 )    Color: Yellow / Appearance: Slightly Turbid / S.010 / pH: x  Gluc: x / Ketone: Trace  / Bili: Moderate / Urobili: 8 mg/dL   Blood: x / Protein: 100 mg/dL / Nitrite: Positive   Leuk Esterase: Moderate / RBC: 0-2 /HPF / WBC 6-10   Sq Epi: x / Non Sq Epi: Few / Bacteria: Moderate      Cultures;   CAPILLARY BLOOD GLUCOSE      POCT Blood Glucose.: 143 mg/dL (2021 11:00)  POCT Blood Glucose.: 153 mg/dL (2021 07:56)  POCT Blood Glucose.: 212 mg/dL (2021 22:12)    Lipid panel:     CARDIAC MARKERS ( 2021 12:26 )  <.015 ng/mL / x     / 606 U/L / x     / <1.0 ng/mL        RADIOLOGY & ADDITIONAL TESTS:      Imaging Personally Reviewed:  [ x] YES      Consultant(s) Notes Reviewed:  [x ] YES     Care Discussed with [x ] Consultants [X ] Patient [x ] Family  [x ]    [x ]  Other; RN

## 2021-01-22 NOTE — PROGRESS NOTE ADULT - PROBLEM SELECTOR PLAN 5
DNR/DNI per [facility]   mets to liver appear worse and presumed lung mets and/or PNA   Hospice- eval per daughter's request DNR/DNI per [facility]   mets to liver appear worse and presumed lung mets and/or PNA   Hospice- eval per daughter's request   with abd pian presumably secondary to mets will start pain meds

## 2021-01-22 NOTE — PROGRESS NOTE ADULT - PROBLEM SELECTOR PROBLEM 2
Type 2 diabetes mellitus without complication, with long-term current use of insulin Sepsis without acute organ dysfunction, due to unspecified organism

## 2021-01-22 NOTE — PROGRESS NOTE ADULT - CONVERSATION DETAILS
daughter provided faxed copy of molst reviewed and dnr/dni ordered placed   also daughter request.  evaluation

## 2021-01-22 NOTE — PROGRESS NOTE ADULT - PROBLEM SELECTOR PLAN 4
DNR/DNI per [facility]   mets to liver appear worse and presumed lung mets and/or PNa   Hospice/Palliative care:  Admit patient to Hospice Service  Start Morphine infusion 100mg in 100ml NS @ 1mg/hr, titrate accordingly.   Morphine 2mg IVP Q4hrs prn for breakthrough pain.  Albuterol/Atrovent 1 unit neb q8hrs PRN  Oxygen via Nasal Canula at 2 L/min, keep saturation > 94% at all times.   Tylenol 650mg PO Q6hrs PRN for fever.  Start Colace 300mg PO QHS.  Fan in room  DVT Prophylaxis: Heparin 5000 units sc q8hrs   GI Prophylaxis: Protonix 40mg PO QDaily   and Hospice nurse evaluation for sub-acute placement.  DNR/DNI in effect. eval per daughter's request continue home meds

## 2021-01-22 NOTE — PROGRESS NOTE ADULT - PROBLEM SELECTOR PLAN 7
per daughter was diagnosis  with " Vertigo- 6 weeks ago was  started on meclizine but dizziness is worse.   given h/o mets.  will get NMRI but would like to get with yun . will hydrate and monitor creatinine if improved creatinine will get mRI w/ and w/o over the weekend if not will then just do without per daughter was diagnosis  with " Vertigo- 6 weeks ago was  started on meclizine but dizziness is worse.   given h/o mets.  will get NMRI but would like to get with yun . will hydrate and monitor creatinine if improved creatinine will get MRI w/ and w/o over the weekend if not will then just do without

## 2021-01-22 NOTE — CONSULT NOTE ADULT - ASSESSMENT
Suspicion for pneumonia her is very low.  No recent chemotherapy  Patient afebrile and HD stable  Physical exam unimpressive for pneumonia  WBC similar to 11/2020 and could be related to her malignancy as well  CT (personally reviewed) similarly not impressive for pneumonia  I do not think she merits antibiotics at this time.    Suggestions--  Stop antibiotics  Manage expectantly    Dr. Kam Shepard covering the service this weekend. Please call [phone] for any ID issues that need attention    Jeffry Rosas MD  Attending Physician  [facility]  Division of Infectious Diseases  [phone]

## 2021-01-22 NOTE — PROGRESS NOTE ADULT - PROBLEM SELECTOR PLAN 3
continue home meds riss riss   was on metformin at home pending resolution of mild DELMAR-will resume metformin at discharge

## 2021-01-23 LAB
ANION GAP SERPL CALC-SCNC: 6 MMOL/L — SIGNIFICANT CHANGE UP (ref 5–17)
BUN SERPL-MCNC: 22 MG/DL — SIGNIFICANT CHANGE UP (ref 7–23)
CALCIUM SERPL-MCNC: 8.3 MG/DL — LOW (ref 8.5–10.1)
CHLORIDE SERPL-SCNC: 103 MMOL/L — SIGNIFICANT CHANGE UP (ref 96–108)
CO2 SERPL-SCNC: 27 MMOL/L — SIGNIFICANT CHANGE UP (ref 22–31)
CREAT SERPL-MCNC: 1.13 MG/DL — SIGNIFICANT CHANGE UP (ref 0.5–1.3)
GLUCOSE BLDC GLUCOMTR-MCNC: 148 MG/DL — HIGH (ref 70–99)
GLUCOSE BLDC GLUCOMTR-MCNC: 148 MG/DL — HIGH (ref 70–99)
GLUCOSE BLDC GLUCOMTR-MCNC: 153 MG/DL — HIGH (ref 70–99)
GLUCOSE BLDC GLUCOMTR-MCNC: 96 MG/DL — SIGNIFICANT CHANGE UP (ref 70–99)
GLUCOSE SERPL-MCNC: 95 MG/DL — SIGNIFICANT CHANGE UP (ref 70–99)
HCT VFR BLD CALC: 31.5 % — LOW (ref 34.5–45)
HGB BLD-MCNC: 9.9 G/DL — LOW (ref 11.5–15.5)
LACTATE SERPL-SCNC: 1.3 MMOL/L — SIGNIFICANT CHANGE UP (ref 0.7–2)
MAGNESIUM SERPL-MCNC: 1.7 MG/DL — SIGNIFICANT CHANGE UP (ref 1.6–2.6)
MCHC RBC-ENTMCNC: 24.1 PG — LOW (ref 27–34)
MCHC RBC-ENTMCNC: 31.4 GM/DL — LOW (ref 32–36)
MCV RBC AUTO: 76.8 FL — LOW (ref 80–100)
NRBC # BLD: 0 /100 WBCS — SIGNIFICANT CHANGE UP (ref 0–0)
PHOSPHATE SERPL-MCNC: 3.2 MG/DL — SIGNIFICANT CHANGE UP (ref 2.5–4.5)
PLATELET # BLD AUTO: 226 K/UL — SIGNIFICANT CHANGE UP (ref 150–400)
POTASSIUM SERPL-MCNC: 4.3 MMOL/L — SIGNIFICANT CHANGE UP (ref 3.5–5.3)
POTASSIUM SERPL-SCNC: 4.3 MMOL/L — SIGNIFICANT CHANGE UP (ref 3.5–5.3)
RBC # BLD: 4.1 M/UL — SIGNIFICANT CHANGE UP (ref 3.8–5.2)
RBC # FLD: 18.2 % — HIGH (ref 10.3–14.5)
SODIUM SERPL-SCNC: 136 MMOL/L — SIGNIFICANT CHANGE UP (ref 135–145)
WBC # BLD: 10.71 K/UL — HIGH (ref 3.8–10.5)
WBC # FLD AUTO: 10.71 K/UL — HIGH (ref 3.8–10.5)

## 2021-01-23 PROCEDURE — 99232 SBSQ HOSP IP/OBS MODERATE 35: CPT

## 2021-01-23 RX ORDER — CEFTRIAXONE 500 MG/1
1000 INJECTION, POWDER, FOR SOLUTION INTRAMUSCULAR; INTRAVENOUS ONCE
Refills: 0 | Status: COMPLETED | OUTPATIENT
Start: 2021-01-23 | End: 2021-01-23

## 2021-01-23 RX ORDER — CEFTRIAXONE 500 MG/1
1000 INJECTION, POWDER, FOR SOLUTION INTRAMUSCULAR; INTRAVENOUS EVERY 24 HOURS
Refills: 0 | Status: DISCONTINUED | OUTPATIENT
Start: 2021-01-24 | End: 2021-01-27

## 2021-01-23 RX ORDER — CEFTRIAXONE 500 MG/1
INJECTION, POWDER, FOR SOLUTION INTRAMUSCULAR; INTRAVENOUS
Refills: 0 | Status: DISCONTINUED | OUTPATIENT
Start: 2021-01-23 | End: 2021-01-27

## 2021-01-23 RX ORDER — SODIUM CHLORIDE 9 MG/ML
1000 INJECTION INTRAMUSCULAR; INTRAVENOUS; SUBCUTANEOUS
Refills: 0 | Status: COMPLETED | OUTPATIENT
Start: 2021-01-23 | End: 2021-01-23

## 2021-01-23 RX ADMIN — HEPARIN SODIUM 5000 UNIT(S): 5000 INJECTION INTRAVENOUS; SUBCUTANEOUS at 18:03

## 2021-01-23 RX ADMIN — MORPHINE SULFATE 2 MILLIGRAM(S): 50 CAPSULE, EXTENDED RELEASE ORAL at 23:17

## 2021-01-23 RX ADMIN — CEFTRIAXONE 1000 MILLIGRAM(S): 500 INJECTION, POWDER, FOR SOLUTION INTRAMUSCULAR; INTRAVENOUS at 14:30

## 2021-01-23 RX ADMIN — SODIUM CHLORIDE 60 MILLILITER(S): 9 INJECTION INTRAMUSCULAR; INTRAVENOUS; SUBCUTANEOUS at 14:36

## 2021-01-23 RX ADMIN — GABAPENTIN 100 MILLIGRAM(S): 400 CAPSULE ORAL at 12:03

## 2021-01-23 RX ADMIN — Medication 25 MILLIGRAM(S): at 18:03

## 2021-01-23 RX ADMIN — POLYETHYLENE GLYCOL 3350 17 GRAM(S): 17 POWDER, FOR SOLUTION ORAL at 12:03

## 2021-01-23 RX ADMIN — Medication 1: at 11:39

## 2021-01-23 RX ADMIN — DULOXETINE HYDROCHLORIDE 20 MILLIGRAM(S): 30 CAPSULE, DELAYED RELEASE ORAL at 06:42

## 2021-01-23 RX ADMIN — INSULIN GLARGINE 12 UNIT(S): 100 INJECTION, SOLUTION SUBCUTANEOUS at 21:44

## 2021-01-23 RX ADMIN — Medication 25 MILLIGRAM(S): at 06:42

## 2021-01-23 RX ADMIN — Medication 25 MILLIGRAM(S): at 21:44

## 2021-01-23 RX ADMIN — Medication 25 MILLIGRAM(S): at 12:03

## 2021-01-23 RX ADMIN — HEPARIN SODIUM 5000 UNIT(S): 5000 INJECTION INTRAVENOUS; SUBCUTANEOUS at 06:42

## 2021-01-23 RX ADMIN — DULOXETINE HYDROCHLORIDE 20 MILLIGRAM(S): 30 CAPSULE, DELAYED RELEASE ORAL at 18:03

## 2021-01-23 NOTE — PROGRESS NOTE ADULT - PROBLEM SELECTOR PLAN 1
antibiotics   f/u cultures   1/23/2021 blood cx ngtd, urine cx GNR. will start antibx ..and monitor final results

## 2021-01-23 NOTE — PROGRESS NOTE ADULT - PROBLEM SELECTOR PLAN 5
DNR/DNI per MOLST   mets to liver appear worse and presumed lung mets and/or PNA   Hospice- eval per daughter's request   with abd pian presumably secondary to mets will start pain meds

## 2021-01-23 NOTE — PROGRESS NOTE ADULT - SUBJECTIVE AND OBJECTIVE BOX
Patient is a 75y old  Female who presents with a chief complaint of short of breath (21 Jan 2021 18:18)      OVERNIGHT EVENTS: none    MEDICATIONS  (STANDING):  dextrose 40% Gel 15 Gram(s) Oral once  dextrose 5%. 1000 milliLiter(s) (50 mL/Hr) IV Continuous <Continuous>  dextrose 5%. 1000 milliLiter(s) (100 mL/Hr) IV Continuous <Continuous>  dextrose 50% Injectable 25 Gram(s) IV Push once  dextrose 50% Injectable 12.5 Gram(s) IV Push once  dextrose 50% Injectable 25 Gram(s) IV Push once  DULoxetine 20 milliGRAM(s) Oral two times a day  gabapentin 100 milliGRAM(s) Oral daily  glucagon  Injectable 1 milliGRAM(s) IntraMuscular once  heparin   Injectable 5000 Unit(s) SubCutaneous every 12 hours  influenza   Vaccine 0.5 milliLiter(s) IntraMuscular once  insulin glargine Injectable (LANTUS) 12 Unit(s) SubCutaneous at bedtime  insulin lispro (ADMELOG) corrective regimen sliding scale   SubCutaneous three times a day before meals  insulin lispro (ADMELOG) corrective regimen sliding scale   SubCutaneous at bedtime  meclizine 25 milliGRAM(s) Oral three times a day  metoprolol tartrate 25 milliGRAM(s) Oral two times a day  polyethylene glycol 3350 17 Gram(s) Oral daily    MEDICATIONS  (PRN):  baclofen 5 milliGRAM(s) Oral two times a day PRN Muscle Spasm  ibuprofen  Tablet. 400 milliGRAM(s) Oral every 8 hours PRN Temp greater or equal to 38C (100.4F), Mild Pain (1 - 3)  morphine  - Injectable 2 milliGRAM(s) IV Push every 6 hours PRN Severe Pain (7 - 10)  ondansetron Injectable 4 milliGRAM(s) IV Push every 6 hours PRN Nausea and/or Vomiting  oxyCODONE    IR 5 milliGRAM(s) Oral every 6 hours PRN Moderate Pain (4 - 6)  traMADol 50 milliGRAM(s) Oral every 6 hours PRN Moderate Pain (4 - 6)      Allergies    No Known Allergies    Intolerances        SUBJECTIVE: in bed in NAD, no acute events overnight     Vital Signs Last 24 Hrs  T(C): 36.7 (22 Jan 2021 23:15), Max: 36.7 (22 Jan 2021 23:15)  T(F): 98 (22 Jan 2021 23:15), Max: 98 (22 Jan 2021 23:15)  HR: 90 (23 Jan 2021 05:10) (81 - 90)  BP: 113/76 (23 Jan 2021 05:10) (113/76 - 149/83)  BP(mean): --  RR: 17 (23 Jan 2021 05:10) (17 - 17)  SpO2: 94% (23 Jan 2021 05:10) (94% - 96%)  PHYSICAL EXAM:  GENERAL: NAD, well-groomed, well-developed  HEAD:  Atraumatic, Normocephalic  EYES: EOMI, PERRLA, conjunctiva and sclera clear  ENMT: No tonsillar erythema, exudates, or enlargement; Moist mucous membranes, Good dentition, No lesions  NECK: Supple, No JVD, Normal thyroid  CHEST/LUNG: Clear to  auscultation bilaterally; No rales, rhonchi, wheezing, or rubs  bilaterally  HEART: Regular rate and rhythm; No murmurs, rubs, or gallops  ABDOMEN: Soft, epigastric tenderness, Nondistended; Bowel sounds present  EXTREMITIES:  2+ Peripheral Pulses, No clubbing, cyanosis, or edema BL LE  SKIN: No rashes or lesions  NERVOUS SYSTEM:  Alert & Oriented X3, Good concentration; Motor Strength 5/5 B/L upper and lower extremities;   DTRs 2+ intact and symmetric, sensation intact BL    LABS:                                  9.9    10.71 )-----------( 226      ( 23 Jan 2021 08:35 )             31.5   01-23    136  |  103  |  22  ----------------------------<  95  4.3   |  27  |  1.13    Ca    8.3<L>      23 Jan 2021 08:35  Phos  3.2     01-23  Mg     1.7     01-23       PTT - ( 21 Jan 2021 12:26 )  PTT:34.0 sec  Urinalysis Basic - ( 21 Jan 2021 17:09 )      Culture - Blood (collected 22 Jan 2021 00:46)  Source: .Blood Blood-Peripheral  Preliminary Report (23 Jan 2021 01:05):    No growth to date.    Culture - Blood (collected 22 Jan 2021 00:35)  Source: .Blood Blood-Peripheral  Preliminary Report (23 Jan 2021 01:05):    No growth to date.    Culture - Urine (collected 22 Jan 2021 00:34)  Source: .Urine Clean Catch (Midstream)  Preliminary Report (23 Jan 2021 05:48):    >100,000 CFU/ml Gram Negative Rods        Cultures;     CAPILLARY BLOOD GLUCOSE      POCT Blood Glucose.: 153 mg/dL (23 Jan 2021 11:16)  POCT Blood Glucose.: 96 mg/dL (23 Jan 2021 07:52)  POCT Blood Glucose.: 130 mg/dL (22 Jan 2021 23:16)  POCT Blood Glucose.: 133 mg/dL (22 Jan 2021 16:18)    Lipid panel:     CARDIAC MARKERS ( 21 Jan 2021 12:26 )  <.015 ng/mL / x     / 606 U/L / x     / <1.0 ng/mL        RADIOLOGY & ADDITIONAL TESTS:      Imaging Personally Reviewed:  [ x] YES      Consultant(s) Notes Reviewed:  [x ] YES     Care Discussed with [x ] Consultants [X ] Patient [x ] Family  [x ]    [x ]  Other; RN

## 2021-01-23 NOTE — PROGRESS NOTE ADULT - PROBLEM SELECTOR PLAN 7
per daughter was diagnosis  with " Vertigo- 6 weeks ago was  started on meclizine but dizziness is worse.   given h/o mets.  will get NMRI but would like to get with yun . will hydrate and monitor creatinine if improved creatinine will get MRI w/ and w/o over the weekend if not will then just do without

## 2021-01-23 NOTE — PROGRESS NOTE ADULT - PROBLEM SELECTOR PLAN 3
riss   was on metformin at home pending resolution of mild DELMAR-will resume metformin at discharge

## 2021-01-24 LAB
-  AMIKACIN: SIGNIFICANT CHANGE UP
-  AMOXICILLIN/CLAVULANIC ACID: SIGNIFICANT CHANGE UP
-  AMPICILLIN/SULBACTAM: SIGNIFICANT CHANGE UP
-  AMPICILLIN: SIGNIFICANT CHANGE UP
-  AZTREONAM: SIGNIFICANT CHANGE UP
-  CEFAZOLIN: SIGNIFICANT CHANGE UP
-  CEFEPIME: SIGNIFICANT CHANGE UP
-  CEFOXITIN: SIGNIFICANT CHANGE UP
-  CEFTRIAXONE: SIGNIFICANT CHANGE UP
-  CIPROFLOXACIN: SIGNIFICANT CHANGE UP
-  ERTAPENEM: SIGNIFICANT CHANGE UP
-  GENTAMICIN: SIGNIFICANT CHANGE UP
-  IMIPENEM: SIGNIFICANT CHANGE UP
-  LEVOFLOXACIN: SIGNIFICANT CHANGE UP
-  MEROPENEM: SIGNIFICANT CHANGE UP
-  NITROFURANTOIN: SIGNIFICANT CHANGE UP
-  PIPERACILLIN/TAZOBACTAM: SIGNIFICANT CHANGE UP
-  TIGECYCLINE: SIGNIFICANT CHANGE UP
-  TOBRAMYCIN: SIGNIFICANT CHANGE UP
-  TRIMETHOPRIM/SULFAMETHOXAZOLE: SIGNIFICANT CHANGE UP
ANION GAP SERPL CALC-SCNC: 7 MMOL/L — SIGNIFICANT CHANGE UP (ref 5–17)
BUN SERPL-MCNC: 19 MG/DL — SIGNIFICANT CHANGE UP (ref 7–23)
CALCIUM SERPL-MCNC: 8.2 MG/DL — LOW (ref 8.5–10.1)
CHLORIDE SERPL-SCNC: 101 MMOL/L — SIGNIFICANT CHANGE UP (ref 96–108)
CO2 SERPL-SCNC: 26 MMOL/L — SIGNIFICANT CHANGE UP (ref 22–31)
CREAT SERPL-MCNC: 0.98 MG/DL — SIGNIFICANT CHANGE UP (ref 0.5–1.3)
CULTURE RESULTS: SIGNIFICANT CHANGE UP
GLUCOSE BLDC GLUCOMTR-MCNC: 103 MG/DL — HIGH (ref 70–99)
GLUCOSE BLDC GLUCOMTR-MCNC: 107 MG/DL — HIGH (ref 70–99)
GLUCOSE BLDC GLUCOMTR-MCNC: 118 MG/DL — HIGH (ref 70–99)
GLUCOSE BLDC GLUCOMTR-MCNC: 94 MG/DL — SIGNIFICANT CHANGE UP (ref 70–99)
GLUCOSE SERPL-MCNC: 96 MG/DL — SIGNIFICANT CHANGE UP (ref 70–99)
HCT VFR BLD CALC: 32.2 % — LOW (ref 34.5–45)
HGB BLD-MCNC: 10 G/DL — LOW (ref 11.5–15.5)
IRON SATN MFR SERPL: 40 % — SIGNIFICANT CHANGE UP (ref 14–50)
IRON SATN MFR SERPL: 57 UG/DL — SIGNIFICANT CHANGE UP (ref 30–160)
MAGNESIUM SERPL-MCNC: 1.9 MG/DL — SIGNIFICANT CHANGE UP (ref 1.6–2.6)
MCHC RBC-ENTMCNC: 23.5 PG — LOW (ref 27–34)
MCHC RBC-ENTMCNC: 31.1 GM/DL — LOW (ref 32–36)
MCV RBC AUTO: 75.8 FL — LOW (ref 80–100)
METHOD TYPE: SIGNIFICANT CHANGE UP
NRBC # BLD: 0 /100 WBCS — SIGNIFICANT CHANGE UP (ref 0–0)
ORGANISM # SPEC MICROSCOPIC CNT: SIGNIFICANT CHANGE UP
ORGANISM # SPEC MICROSCOPIC CNT: SIGNIFICANT CHANGE UP
PHOSPHATE SERPL-MCNC: 2.7 MG/DL — SIGNIFICANT CHANGE UP (ref 2.5–4.5)
PLATELET # BLD AUTO: 184 K/UL — SIGNIFICANT CHANGE UP (ref 150–400)
POTASSIUM SERPL-MCNC: 4.3 MMOL/L — SIGNIFICANT CHANGE UP (ref 3.5–5.3)
POTASSIUM SERPL-SCNC: 4.3 MMOL/L — SIGNIFICANT CHANGE UP (ref 3.5–5.3)
RBC # BLD: 4.25 M/UL — SIGNIFICANT CHANGE UP (ref 3.8–5.2)
RBC # FLD: 18.6 % — HIGH (ref 10.3–14.5)
SODIUM SERPL-SCNC: 134 MMOL/L — LOW (ref 135–145)
SPECIMEN SOURCE: SIGNIFICANT CHANGE UP
TIBC SERPL-MCNC: 144 UG/DL — LOW (ref 220–430)
UIBC SERPL-MCNC: 87 UG/DL — LOW (ref 110–370)
WBC # BLD: 9.62 K/UL — SIGNIFICANT CHANGE UP (ref 3.8–10.5)
WBC # FLD AUTO: 9.62 K/UL — SIGNIFICANT CHANGE UP (ref 3.8–10.5)

## 2021-01-24 PROCEDURE — 99232 SBSQ HOSP IP/OBS MODERATE 35: CPT

## 2021-01-24 RX ADMIN — DULOXETINE HYDROCHLORIDE 20 MILLIGRAM(S): 30 CAPSULE, DELAYED RELEASE ORAL at 05:15

## 2021-01-24 RX ADMIN — CEFTRIAXONE 100 MILLIGRAM(S): 500 INJECTION, POWDER, FOR SOLUTION INTRAMUSCULAR; INTRAVENOUS at 13:43

## 2021-01-24 RX ADMIN — Medication 25 MILLIGRAM(S): at 05:15

## 2021-01-24 RX ADMIN — INSULIN GLARGINE 12 UNIT(S): 100 INJECTION, SOLUTION SUBCUTANEOUS at 22:04

## 2021-01-24 RX ADMIN — HEPARIN SODIUM 5000 UNIT(S): 5000 INJECTION INTRAVENOUS; SUBCUTANEOUS at 17:33

## 2021-01-24 RX ADMIN — GABAPENTIN 100 MILLIGRAM(S): 400 CAPSULE ORAL at 13:43

## 2021-01-24 RX ADMIN — Medication 25 MILLIGRAM(S): at 14:02

## 2021-01-24 RX ADMIN — POLYETHYLENE GLYCOL 3350 17 GRAM(S): 17 POWDER, FOR SOLUTION ORAL at 14:03

## 2021-01-24 RX ADMIN — Medication 25 MILLIGRAM(S): at 22:04

## 2021-01-24 RX ADMIN — Medication 25 MILLIGRAM(S): at 17:33

## 2021-01-24 RX ADMIN — HEPARIN SODIUM 5000 UNIT(S): 5000 INJECTION INTRAVENOUS; SUBCUTANEOUS at 05:15

## 2021-01-24 RX ADMIN — MORPHINE SULFATE 2 MILLIGRAM(S): 50 CAPSULE, EXTENDED RELEASE ORAL at 14:02

## 2021-01-24 RX ADMIN — DULOXETINE HYDROCHLORIDE 20 MILLIGRAM(S): 30 CAPSULE, DELAYED RELEASE ORAL at 17:33

## 2021-01-24 NOTE — PROGRESS NOTE ADULT - SUBJECTIVE AND OBJECTIVE BOX
Patient is a 75y old  Female who presents with a chief complaint of short of breath (21 Jan 2021 18:18)      OVERNIGHT EVENTS: none  MEDICATIONS  (STANDING):  cefTRIAXone   IVPB      cefTRIAXone   IVPB 1000 milliGRAM(s) IV Intermittent every 24 hours  dextrose 40% Gel 15 Gram(s) Oral once  dextrose 5%. 1000 milliLiter(s) (50 mL/Hr) IV Continuous <Continuous>  dextrose 5%. 1000 milliLiter(s) (100 mL/Hr) IV Continuous <Continuous>  dextrose 50% Injectable 25 Gram(s) IV Push once  dextrose 50% Injectable 12.5 Gram(s) IV Push once  dextrose 50% Injectable 25 Gram(s) IV Push once  DULoxetine 20 milliGRAM(s) Oral two times a day  gabapentin 100 milliGRAM(s) Oral daily  glucagon  Injectable 1 milliGRAM(s) IntraMuscular once  heparin   Injectable 5000 Unit(s) SubCutaneous every 12 hours  influenza   Vaccine 0.5 milliLiter(s) IntraMuscular once  insulin glargine Injectable (LANTUS) 12 Unit(s) SubCutaneous at bedtime  insulin lispro (ADMELOG) corrective regimen sliding scale   SubCutaneous three times a day before meals  insulin lispro (ADMELOG) corrective regimen sliding scale   SubCutaneous at bedtime  meclizine 25 milliGRAM(s) Oral three times a day  metoprolol tartrate 25 milliGRAM(s) Oral two times a day  polyethylene glycol 3350 17 Gram(s) Oral daily    MEDICATIONS  (PRN):  baclofen 5 milliGRAM(s) Oral two times a day PRN Muscle Spasm  ibuprofen  Tablet. 400 milliGRAM(s) Oral every 8 hours PRN Temp greater or equal to 38C (100.4F), Mild Pain (1 - 3)  morphine  - Injectable 2 milliGRAM(s) IV Push every 6 hours PRN Severe Pain (7 - 10)  ondansetron Injectable 4 milliGRAM(s) IV Push every 6 hours PRN Nausea and/or Vomiting  oxyCODONE    IR 5 milliGRAM(s) Oral every 6 hours PRN Moderate Pain (4 - 6)  traMADol 50 milliGRAM(s) Oral every 6 hours PRN Moderate Pain (4 - 6)        Allergies    No Known Allergies    Intolerances        SUBJECTIVE: in bed in NAD, no acute events overnight     Vital Signs Last 24 Hrs  T(C): 37.1 (24 Jan 2021 04:58), Max: 37.3 (23 Jan 2021 23:19)  T(F): 98.8 (24 Jan 2021 04:58), Max: 99.1 (23 Jan 2021 23:19)  HR: 83 (24 Jan 2021 04:58) (83 - 87)  BP: 125/85 (24 Jan 2021 04:58) (121/79 - 133/75)  BP(mean): --  RR: 18 (24 Jan 2021 04:58) (17 - 18)  SpO2: 94% (24 Jan 2021 04:58) (94% - 100%)    PHYSICAL EXAM:  GENERAL: NAD, well-groomed, well-developed  HEAD:  Atraumatic, Normocephalic  EYES: EOMI, PERRLA, conjunctiva and sclera clear  ENMT: No tonsillar erythema, exudates, or enlargement; Moist mucous membranes, Good dentition, No lesions  NECK: Supple, No JVD, Normal thyroid  CHEST/LUNG: Clear to  auscultation bilaterally; No rales, rhonchi, wheezing, or rubs  bilaterally  HEART: Regular rate and rhythm; No murmurs, rubs, or gallops  ABDOMEN: Soft, epigastric tenderness, Nondistended; Bowel sounds present  EXTREMITIES:  2+ Peripheral Pulses, No clubbing, cyanosis, or edema BL LE  SKIN: No rashes or lesions  NERVOUS SYSTEM:  Alert & Oriented X3, Good concentration; Motor Strength 5/5 B/L upper and lower extremities;   DTRs 2+ intact and symmetric, sensation intact BL    LABS:                                  9.9    10.71 )-----------( 226      ( 23 Jan 2021 08:35 )             31.5   01-23    136  |  103  |  22  ----------------------------<  95  4.3   |  27  |  1.13    Ca    8.3<L>      23 Jan 2021 08:35  Phos  3.2     01-23  Mg     1.7     01-23       PTT - ( 21 Jan 2021 12:26 )  PTT:34.0 sec  Urinalysis Basic - ( 21 Jan 2021 17:09 )      Culture - Blood (collected 22 Jan 2021 00:46)  Source: .Blood Blood-Peripheral  Preliminary Report (23 Jan 2021 01:05):    No growth to date.    Culture - Blood (collected 22 Jan 2021 00:35)  Source: .Blood Blood-Peripheral  Preliminary Report (23 Jan 2021 01:05):    No growth to date.    Culture - Urine (collected 22 Jan 2021 00:34)  Source: .Urine Clean Catch (Midstream)  Preliminary Report (23 Jan 2021 05:48):    >100,000 CFU/ml Gram Negative Rods        Cultures;     CAPILLARY BLOOD GLUCOSE      POCT Blood Glucose.: 153 mg/dL (23 Jan 2021 11:16)  POCT Blood Glucose.: 96 mg/dL (23 Jan 2021 07:52)  POCT Blood Glucose.: 130 mg/dL (22 Jan 2021 23:16)  POCT Blood Glucose.: 133 mg/dL (22 Jan 2021 16:18)    Lipid panel:     CARDIAC MARKERS ( 21 Jan 2021 12:26 )  <.015 ng/mL / x     / 606 U/L / x     / <1.0 ng/mL        RADIOLOGY & ADDITIONAL TESTS:      Imaging Personally Reviewed:  [ x] YES      Consultant(s) Notes Reviewed:  [x ] YES     Care Discussed with [x ] Consultants [X ] Patient [x ] Family  [x ]    [x ]  Other; RN

## 2021-01-24 NOTE — PROGRESS NOTE ADULT - PROBLEM SELECTOR PLAN 2
POA and due to the above POA and due to the above  1/24/2021 I started antibx on 1/23/2021 fir Urinary Tract Infection: pt c/o of frequency and discoloration  and urine cx positive for e coli

## 2021-01-24 NOTE — PHYSICAL THERAPY INITIAL EVALUATION ADULT - ADDITIONAL COMMENTS
There are 4 steps, c alan rails, at the entry of the house and one flight of steps, c R rail up, to negotiate at home.

## 2021-01-24 NOTE — PHYSICAL THERAPY INITIAL EVALUATION ADULT - TRANSFER TRAINING, PT EVAL
Pt will independently perform sit to stand to sit transfers without LOB using rolling walker by 2 weeks

## 2021-01-24 NOTE — PROGRESS NOTE ADULT - PROBLEM SELECTOR PLAN 1
antibiotics   f/u cultures   1/23/2021 blood cx ngtd, urine cx GNR. will start antibx ..and monitor final results antibiotics   f/u cultures   1/23/2021 blood cx ngtd, urine cx GNR. will start antibx ..and monitor final results  1/24/2021 per ID celined antibx for pna

## 2021-01-24 NOTE — PHYSICAL THERAPY INITIAL EVALUATION ADULT - GAIT DEVIATIONS NOTED, PT EVAL
decreased miguel/increased time in double stance/decreased velocity of limb motion/decreased step length/decreased stride length

## 2021-01-24 NOTE — PROGRESS NOTE ADULT - PROBLEM SELECTOR PLAN 6
will hydrate and observe  1/23/2021 improving slowly continue with ivf, check labs in am will hydrate and observe  1/23/2021 improving slowly continue with ivf, check labs in am  1/24/2021 improving

## 2021-01-24 NOTE — PHYSICAL THERAPY INITIAL EVALUATION ADULT - PERTINENT HX OF CURRENT PROBLEM, REHAB EVAL
Patient is a 75y old  Female who presents with a chief complaint of short of breath, vomitting, chest discomfort and weakness

## 2021-01-24 NOTE — PROGRESS NOTE ADULT - PROBLEM SELECTOR PLAN 7
per daughter was diagnosis  with " Vertigo- 6 weeks ago was  started on meclizine but dizziness is worse.   given h/o mets.  will get NMRI but would like to get with yun . will hydrate and monitor creatinine if improved creatinine will get MRI w/ and w/o over the weekend if not will then just do without per daughter was diagnosis  with " Vertigo- 6 weeks ago was  started on meclizine but dizziness is worse.   given h/o mets.  will get MRI but would like to get with yun . will hydrate and monitor creatinine if improved creatinine will get MRI w/ and w/o over the weekend if not will then just do without  1/24/201 f/u mri

## 2021-01-25 LAB
FERRITIN SERPL-MCNC: HIGH NG/ML (ref 15–150)
FLUAV AG NPH QL: SIGNIFICANT CHANGE UP COUNTS
FLUBV AG NPH QL: SIGNIFICANT CHANGE UP COUNTS
GLUCOSE BLDC GLUCOMTR-MCNC: 102 MG/DL — HIGH (ref 70–99)
GLUCOSE BLDC GLUCOMTR-MCNC: 102 MG/DL — HIGH (ref 70–99)
GLUCOSE BLDC GLUCOMTR-MCNC: 82 MG/DL — SIGNIFICANT CHANGE UP (ref 70–99)
RSV RNA NPH QL NAA+NON-PROBE: SIGNIFICANT CHANGE UP COUNTS
SARS-COV-2 RNA SPEC QL NAA+PROBE: SIGNIFICANT CHANGE UP COUNTS

## 2021-01-25 PROCEDURE — 99232 SBSQ HOSP IP/OBS MODERATE 35: CPT

## 2021-01-25 PROCEDURE — 71045 X-RAY EXAM CHEST 1 VIEW: CPT | Mod: 26

## 2021-01-25 PROCEDURE — 70551 MRI BRAIN STEM W/O DYE: CPT | Mod: 26

## 2021-01-25 PROCEDURE — 76705 ECHO EXAM OF ABDOMEN: CPT | Mod: 26

## 2021-01-25 RX ORDER — LACTULOSE 10 G/15ML
10 SOLUTION ORAL
Refills: 0 | Status: COMPLETED | OUTPATIENT
Start: 2021-01-25 | End: 2021-01-27

## 2021-01-25 RX ADMIN — GABAPENTIN 100 MILLIGRAM(S): 400 CAPSULE ORAL at 11:24

## 2021-01-25 RX ADMIN — Medication 25 MILLIGRAM(S): at 15:45

## 2021-01-25 RX ADMIN — LACTULOSE 10 GRAM(S): 10 SOLUTION ORAL at 18:37

## 2021-01-25 RX ADMIN — INSULIN GLARGINE 12 UNIT(S): 100 INJECTION, SOLUTION SUBCUTANEOUS at 22:31

## 2021-01-25 RX ADMIN — HEPARIN SODIUM 5000 UNIT(S): 5000 INJECTION INTRAVENOUS; SUBCUTANEOUS at 05:34

## 2021-01-25 RX ADMIN — Medication 25 MILLIGRAM(S): at 05:34

## 2021-01-25 RX ADMIN — Medication 25 MILLIGRAM(S): at 22:31

## 2021-01-25 RX ADMIN — TRAMADOL HYDROCHLORIDE 50 MILLIGRAM(S): 50 TABLET ORAL at 03:18

## 2021-01-25 RX ADMIN — Medication 25 MILLIGRAM(S): at 18:37

## 2021-01-25 RX ADMIN — HEPARIN SODIUM 5000 UNIT(S): 5000 INJECTION INTRAVENOUS; SUBCUTANEOUS at 18:37

## 2021-01-25 RX ADMIN — DULOXETINE HYDROCHLORIDE 20 MILLIGRAM(S): 30 CAPSULE, DELAYED RELEASE ORAL at 18:37

## 2021-01-25 RX ADMIN — POLYETHYLENE GLYCOL 3350 17 GRAM(S): 17 POWDER, FOR SOLUTION ORAL at 11:24

## 2021-01-25 RX ADMIN — Medication 1 ENEMA: at 18:38

## 2021-01-25 RX ADMIN — Medication 25 MILLIGRAM(S): at 05:33

## 2021-01-25 RX ADMIN — CEFTRIAXONE 100 MILLIGRAM(S): 500 INJECTION, POWDER, FOR SOLUTION INTRAMUSCULAR; INTRAVENOUS at 11:32

## 2021-01-25 RX ADMIN — DULOXETINE HYDROCHLORIDE 20 MILLIGRAM(S): 30 CAPSULE, DELAYED RELEASE ORAL at 05:33

## 2021-01-25 NOTE — DIETITIAN INITIAL EVALUATION ADULT. - OTHER CALCULATIONS
Ht (cm):    167.64   Wt (kg):  95.7 (1/22)     BMI:     34.1    IBW: 59.1 kg   %IBW:  162%   UBW:  98.4 kg   %UBW:97%

## 2021-01-25 NOTE — PROGRESS NOTE ADULT - SUBJECTIVE AND OBJECTIVE BOX
Patient is a 75y old  Female who presents with a chief complaint of short of breath (21 Jan 2021 18:18)      OVERNIGHT EVENTS: none  MEDICATIONS  (STANDING):  cefTRIAXone   IVPB      cefTRIAXone   IVPB 1000 milliGRAM(s) IV Intermittent every 24 hours  dextrose 40% Gel 15 Gram(s) Oral once  dextrose 5%. 1000 milliLiter(s) (50 mL/Hr) IV Continuous <Continuous>  dextrose 5%. 1000 milliLiter(s) (100 mL/Hr) IV Continuous <Continuous>  dextrose 50% Injectable 25 Gram(s) IV Push once  dextrose 50% Injectable 12.5 Gram(s) IV Push once  dextrose 50% Injectable 25 Gram(s) IV Push once  DULoxetine 20 milliGRAM(s) Oral two times a day  gabapentin 100 milliGRAM(s) Oral daily  glucagon  Injectable 1 milliGRAM(s) IntraMuscular once  heparin   Injectable 5000 Unit(s) SubCutaneous every 12 hours  influenza   Vaccine 0.5 milliLiter(s) IntraMuscular once  insulin glargine Injectable (LANTUS) 12 Unit(s) SubCutaneous at bedtime  insulin lispro (ADMELOG) corrective regimen sliding scale   SubCutaneous three times a day before meals  insulin lispro (ADMELOG) corrective regimen sliding scale   SubCutaneous at bedtime  meclizine 25 milliGRAM(s) Oral three times a day  metoprolol tartrate 25 milliGRAM(s) Oral two times a day  polyethylene glycol 3350 17 Gram(s) Oral daily    MEDICATIONS  (PRN):  baclofen 5 milliGRAM(s) Oral two times a day PRN Muscle Spasm  ibuprofen  Tablet. 400 milliGRAM(s) Oral every 8 hours PRN Temp greater or equal to 38C (100.4F), Mild Pain (1 - 3)  morphine  - Injectable 2 milliGRAM(s) IV Push every 6 hours PRN Severe Pain (7 - 10)  ondansetron Injectable 4 milliGRAM(s) IV Push every 6 hours PRN Nausea and/or Vomiting  oxyCODONE    IR 5 milliGRAM(s) Oral every 6 hours PRN Moderate Pain (4 - 6)  traMADol 50 milliGRAM(s) Oral every 6 hours PRN Moderate Pain (4 - 6)        Allergies    No Known Allergies    Intolerances        SUBJECTIVE: in bed in NAD, no acute events overnight     Vital Signs Last 24 Hrs  T(C): 37.1 (24 Jan 2021 04:58), Max: 37.3 (23 Jan 2021 23:19)  T(F): 98.8 (24 Jan 2021 04:58), Max: 99.1 (23 Jan 2021 23:19)  HR: 83 (24 Jan 2021 04:58) (83 - 87)  BP: 125/85 (24 Jan 2021 04:58) (121/79 - 133/75)  BP(mean): --  RR: 18 (24 Jan 2021 04:58) (17 - 18)  SpO2: 94% (24 Jan 2021 04:58) (94% - 100%)    PHYSICAL EXAM:  GENERAL: NAD, well-groomed, well-developed  HEAD:  Atraumatic, Normocephalic  EYES: EOMI, PERRLA, conjunctiva and sclera clear  ENMT: No tonsillar erythema, exudates, or enlargement; Moist mucous membranes, Good dentition, No lesions  NECK: Supple, No JVD, Normal thyroid  CHEST/LUNG: Clear to  auscultation bilaterally; No rales, rhonchi, wheezing, or rubs  bilaterally  HEART: Regular rate and rhythm; No murmurs, rubs, or gallops  ABDOMEN: Soft, epigastric tenderness, Nondistended; Bowel sounds present  EXTREMITIES:  2+ Peripheral Pulses, No clubbing, cyanosis, or edema BL LE  SKIN: No rashes or lesions  NERVOUS SYSTEM:  Alert & Oriented X3, Good concentration; Motor Strength 5/5 B/L upper and lower extremities;   DTRs 2+ intact and symmetric, sensation intact BL    LABS:                                  9.9    10.71 )-----------( 226      ( 23 Jan 2021 08:35 )             31.5   01-23    136  |  103  |  22  ----------------------------<  95  4.3   |  27  |  1.13    Ca    8.3<L>      23 Jan 2021 08:35  Phos  3.2     01-23  Mg     1.7     01-23       PTT - ( 21 Jan 2021 12:26 )  PTT:34.0 sec  Urinalysis Basic - ( 21 Jan 2021 17:09 )      Culture - Blood (collected 22 Jan 2021 00:46)  Source: .Blood Blood-Peripheral  Preliminary Report (23 Jan 2021 01:05):    No growth to date.    Culture - Blood (collected 22 Jan 2021 00:35)  Source: .Blood Blood-Peripheral  Preliminary Report (23 Jan 2021 01:05):    No growth to date.    Culture - Urine (collected 22 Jan 2021 00:34)  Source: .Urine Clean Catch (Midstream)  Preliminary Report (23 Jan 2021 05:48):    >100,000 CFU/ml Gram Negative Rods        Cultures;     CAPILLARY BLOOD GLUCOSE        POCT Blood Glucose.: 102 mg/dL (25 Jan 2021 11:31)  POCT Blood Glucose.: 82 mg/dL (25 Jan 2021 07:39)  POCT Blood Glucose.: 118 mg/dL (24 Jan 2021 21:17)  POCT Blood Glucose.: 107 mg/dL (24 Jan 2021 16:28)    Lipid panel:     CARDIAC MARKERS ( 21 Jan 2021 12:26 )  <.015 ng/mL / x     / 606 U/L / x     / <1.0 ng/mL        RADIOLOGY & ADDITIONAL TESTS:    < from: MR Head No Cont (01.25.21 @ 10:21) >    IMPRESSION:  Limited study. No acute infarction.            < end of copied text >    Imaging Personally Reviewed:  [ x] YES      Consultant(s) Notes Reviewed:  [x ] YES     Care Discussed with [x ] Consultants [X ] Patient [x ] Family  [x ]    [x ]  Other; RN

## 2021-01-25 NOTE — PROGRESS NOTE ADULT - PROBLEM SELECTOR PLAN 1
antibiotics   f/u cultures   1/23/2021 blood cx ngtd, urine cx GNR. will start antibx ..and monitor final results  1/24/2021 per ID declined antibx for pna

## 2021-01-25 NOTE — PROGRESS NOTE ADULT - PROBLEM SELECTOR PLAN 3
riss   was on metformin at home pending resolution of mild DELMAR- will resume metformin at discharge

## 2021-01-25 NOTE — DIETITIAN INITIAL EVALUATION ADULT. - PERTINENT MEDS FT
Heparin, Rocephin, Lantus, Admelog sliding scale, Morphine, Oxycodone, Miralax, Lioresal, Cymbalta, Neurontin, Antivert, Lopressor, Ultram

## 2021-01-25 NOTE — PROGRESS NOTE ADULT - PROBLEM SELECTOR PLAN 6
will hydrate and observe  1/23/2021 improving slowly continue with ivf, check labs in am  1/24/2021 improving

## 2021-01-25 NOTE — PROGRESS NOTE ADULT - PROBLEM SELECTOR PLAN 7
per daughter was diagnosis  with " Vertigo- 6 weeks ago was  started on meclizine but dizziness is worse.   given h/o mets.  will get MRI but would like to get with yun . will hydrate and monitor creatinine if improved creatinine will get MRI w/ and w/o over the weekend if not will then just do without  1/24/201 f/u mri  1/25/2021 mri unremarkable

## 2021-01-25 NOTE — DIETITIAN INITIAL EVALUATION ADULT. - NUTRITION DIAGNOSITC TERMINOLOGY #1
[FreeTextEntry1] : will treat with Azithromycin, take as directed\par c/w salt water gargles\par c/w Ibuprofen when needed\par warm liquids\par if no improvement- go to urgent care Increased Nutrient Needs...

## 2021-01-25 NOTE — DIETITIAN INITIAL EVALUATION ADULT. - OTHER INFO
Pt lives at home c daughter; family is requesting home hospice at d/c. Pt c metastasized breast Ca (to liver & possibly lungs). Pt takes Metformin at home at control BG levels. Pt reports appetite is poor; requests Glucerna.  Denies any N/V/C/D or chew/swallowing difficulty. Will liberalize diet for maximum intake.  Wt loss as noted reported by pt.

## 2021-01-25 NOTE — PROGRESS NOTE ADULT - SUBJECTIVE AND OBJECTIVE BOX
[facility]  Division of Infectious Diseases  [phone]    Name: HUGH WILLIAM  Age: 75y  Gender: Female  MRN: [id number]    Interval History--  Notes reviewed    Past Medical History--  Hypertension    Diabetes    Breast cancer    Essential hypertension    Diabetes mellitus    Post-lymphadenectomy lymphedema of arm        For details regarding the patient's social history, family history, and other miscellaneous elements, please refer the initial infectious diseases consultation and/or the admitting history and physical examination for this admission.    Allergies    No Known Allergies    Intolerances        Medications--  Antibiotics:  cefTRIAXone   IVPB      cefTRIAXone   IVPB 1000 milliGRAM(s) IV Intermittent every 24 hours    Immunologic:  influenza   Vaccine 0.5 milliLiter(s) IntraMuscular once    Other:  baclofen PRN  dextrose 40% Gel  dextrose 5%.  dextrose 5%.  dextrose 50% Injectable  dextrose 50% Injectable  dextrose 50% Injectable  DULoxetine  gabapentin  glucagon  Injectable  heparin   Injectable  ibuprofen  Tablet. PRN  insulin glargine Injectable (LANTUS)  insulin lispro (ADMELOG) corrective regimen sliding scale  insulin lispro (ADMELOG) corrective regimen sliding scale  meclizine  metoprolol tartrate  morphine  - Injectable PRN  ondansetron Injectable PRN  oxyCODONE    IR PRN  polyethylene glycol 3350  traMADol PRN      Review of Systems--  A 10-point review of systems was obtained.     Pertinent positives and negatives--  Constitutional: No fevers. No Chills. No Rigors.   Cardiovascular: No chest pain. No palpitations.  Respiratory: No shortness of breath. No cough.  Gastrointestinal: No nausea or vomiting. No diarrhea or constipation.   Psychiatric: Pleasant. Appropriate affect.    Review of systems otherwise negative except as previously noted.    Physical Examination--  Vital Signs: T(F): 98.1 (01-25-21 @ 11:36), Max: 99.5 (01-24-21 @ 23:13)  HR: 74 (01-25-21 @ 11:36)  BP: 119/73 (01-25-21 @ 11:36)  RR: 18 (01-25-21 @ 11:36)  SpO2: 94% (01-25-21 @ 11:36)  Wt(kg): --  General: Nontoxic-appearing Female in no acute distress.  HEENT: AT/NC. PERRL. EOMI. Anicteric. Conjunctiva pink and moist. Oropharynx clear. Dentition fair.  Neck: Not rigid. No sense of mass.  Nodes: None palpable.  Lungs: Clear bilaterally without rales, wheezing or rhonchi  Heart: Regular rate and rhythm. No Murmur. No rub. No gallop. No palpable thrill.  Abdomen: Bowel sounds present and normoactive. Soft. Nondistended. Nontender. No sense of mass. No organomegaly.  Back: No spinal tenderness. No costovertebral angle tenderness.   Extremities: No cyanosis or clubbing. No edema.   Skin: Warm. Dry. Good turgor. No rash. No vasculitic stigmata.  Psychiatric: Appropriate affect and mood for situation.         Laboratory Studies--  CBC                        10.0   9.62  )-----------( 184      ( 24 Jan 2021 06:38 )             32.2       Chemistries  01-24    134<L>  |  101  |  19  ----------------------------<  96  4.3   |  26  |  0.98    Ca    8.2<L>      24 Jan 2021 06:38  Phos  2.7     01-24  Mg     1.9     01-24        Culture Data    Culture - Blood (collected 22 Jan 2021 00:46)  Source: .Blood Blood-Peripheral  Preliminary Report (23 Jan 2021 01:05):    No growth to date.    Culture - Blood (collected 22 Jan 2021 00:35)  Source: .Blood Blood-Peripheral  Preliminary Report (23 Jan 2021 01:05):    No growth to date.    Culture - Urine (collected 22 Jan 2021 00:34)  Source: .Urine Clean Catch (Midstream)  Final Report (24 Jan 2021 09:15):    >100,000 CFU/ml Escherichia coli  Organism: Escherichia coli (24 Jan 2021 09:15)  Organism: Escherichia coli (24 Jan 2021 09:15)             [facility]  Division of Infectious Diseases  [phone]    Name: HUGH WILLIAM  Age: 75y  Gender: Female  MRN: [id number]    Interval History--  Notes reviewed. Seen earlier today. Pain control ok. No fevers, chills, or rigors. Breathing is comfortable at present, does still have some dyspnea on exertion. Tolerating PO. No other complaints. Has pain in left foot at times.     Past Medical History--  Hypertension    Diabetes    Breast cancer    Essential hypertension    Diabetes mellitus    Post-lymphadenectomy lymphedema of arm        For details regarding the patient's social history, family history, and other miscellaneous elements, please refer the initial infectious diseases consultation and/or the admitting history and physical examination for this admission.    Allergies    No Known Allergies    Intolerances        Medications--  Antibiotics:  cefTRIAXone   IVPB      cefTRIAXone   IVPB 1000 milliGRAM(s) IV Intermittent every 24 hours    Immunologic:  influenza   Vaccine 0.5 milliLiter(s) IntraMuscular once    Other:  baclofen PRN  dextrose 40% Gel  dextrose 5%.  dextrose 5%.  dextrose 50% Injectable  dextrose 50% Injectable  dextrose 50% Injectable  DULoxetine  gabapentin  glucagon  Injectable  heparin   Injectable  ibuprofen  Tablet. PRN  insulin glargine Injectable (LANTUS)  insulin lispro (ADMELOG) corrective regimen sliding scale  insulin lispro (ADMELOG) corrective regimen sliding scale  meclizine  metoprolol tartrate  morphine  - Injectable PRN  ondansetron Injectable PRN  oxyCODONE    IR PRN  polyethylene glycol 3350  traMADol PRN      Review of Systems--  A 10-point review of systems was obtained.   Review of systems otherwise negative except as previously noted.    Physical Examination--  Vital Signs: T(F): 98.1 (01-25-21 @ 11:36), Max: 99.5 (01-24-21 @ 23:13)  HR: 74 (01-25-21 @ 11:36)  BP: 119/73 (01-25-21 @ 11:36)  RR: 18 (01-25-21 @ 11:36)  SpO2: 94% (01-25-21 @ 11:36)  Wt(kg): --  General: Nontoxic-appearing Female in no acute distress.  HEENT: AT/NC. Mildy icteric. Conjunctiva pink and moist. Oropharynx clear.  Neck: Not rigid. No sense of mass.  Nodes: None palpable.  Lungs: Diminished breath sounds bilaterally without rales, wheezing or rhonchi  Heart: Regular rate and rhythm. No Murmur. No rub. No gallop. No palpable thrill.  Abdomen: Bowel sounds present and normoactive. Soft. Nondistended. Liver no change. Obese  Extremities: No cyanosis or clubbing. 3+ LLE edema, 2+ on R. R foot slightly cool compared with left but not ischemic appearing.   Skin: Warm. Dry. Good turgor. No rash. No vasculitic stigmata.  Psychiatric: Appropriate affect and mood for situation.         Laboratory Studies--  CBC                        10.0   9.62  )-----------( 184      ( 24 Jan 2021 06:38 )             32.2       Chemistries  01-24    134<L>  |  101  |  19  ----------------------------<  96  4.3   |  26  |  0.98    Ca    8.2<L>      24 Jan 2021 06:38  Phos  2.7     01-24  Mg     1.9     01-24        Culture Data    Culture - Blood (collected 22 Jan 2021 00:46)  Source: .Blood Blood-Peripheral  Preliminary Report (23 Jan 2021 01:05):    No growth to date.    Culture - Blood (collected 22 Jan 2021 00:35)  Source: .Blood Blood-Peripheral  Preliminary Report (23 Jan 2021 01:05):    No growth to date.    Culture - Urine (collected 22 Jan 2021 00:34)  Source: .Urine Clean Catch (Midstream)  Final Report (24 Jan 2021 09:15):    >100,000 CFU/ml Escherichia coli  Organism: Escherichia coli (24 Jan 2021 09:15)  Organism: Escherichia coli (24 Jan 2021 09:15)

## 2021-01-25 NOTE — DIETITIAN INITIAL EVALUATION ADULT. - PERTINENT LABORATORY DATA
01-24 Na134 mmol/L<L> Glu 96 mg/dL K+ 4.3 mmol/L Cr  0.98 mg/dL BUN 19 mg/dL 01-24 Phos 2.7 mg/dL 01-21 Alb 2.3 g/dL<L>; 01-22-21  A1C 8.0%, average glu 183; 01-24 POCT: 94, 103, 107, 118

## 2021-01-25 NOTE — CONSULT NOTE ADULT - SUBJECTIVE AND OBJECTIVE BOX
[facility] NEPHROLOGY SERVICES, [facility]  NEPHROLOGY AND HYPERTENSION  [address]  [address]  [place] [address]  [phone]    MD CORBY VERDUGO MD ANDREY GONCHARUK, MD MADHU KORRAPATI, MD YELENA ROSENBERG, MD BINNY KOSHY, MD CHRISTOPHER CAPUTO, MD JONNY VILLARREAL MD      Information from chart:  "Patient is a 75y old  Female who presents with a chief complaint of short of breath (25 Jan 2021 13:34)    HPI:   75 year old female with h/o HTN, DM and R breast cancer (diagnosed in 2015, s/p chemo and radiation, recurrence in 2019, stopped treatment in Sept 2020) with mets to the liver presents today c/o 1 1/2 week h/o dizziness,  unsteadiness and nausea, pt also reports feeling SOB, palpitations and bilateral LE edema, pt denies h/o vertigo, (-) chest pain (-) fevers or chills +mild cough usually at night (-) sick contacts (-) headache (-) visual or speech changes     (21 Jan 2021 18:18)   "    Noted fluctuating Cr and GFR  Trend Bun/Cr  01-24-21 @ 06:38  BUN/CR -  19 / 0.98  01-23-21 @ 08:35  BUN/CR -  22 / 1.13  01-21-21 @ 12:26  BUN/CR -  18 / 1.24  11-30-20 @ 00:49  BUN/CR -  11 / 0.73  07-24-19 @ 21:43  BUN/CR -  17 / 0.90  05-09-19 @ 08:44  BUN/CR -  19 / 0.90  01-23-19 @ 01:33  BUN/CR -  18 / 0.97      PAST MEDICAL & SURGICAL HISTORY:  Hypertension    Diabetes    Breast cancer    Essential hypertension    Diabetes mellitus    Post-lymphadenectomy lymphedema of arm  R arm      FAMILY HISTORY:  No pertinent family history in first degree relatives      Allergies    No Known Allergies    Intolerances      Home Medications:  DULoxetine 20 mg oral delayed release capsule: 1 cap(s) orally 2 times a day (29 Nov 2020 21:31)  gabapentin:  orally  (29 Nov 2020 21:31)  meclizine 25 mg oral tablet: 1 tab(s) orally 3 times a day (29 Nov 2020 21:31)  metFORMIN 850 mg oral tablet: 1 tab(s) orally once a day (in the morning) (29 Nov 2020 21:31)  metoprolol:  orally once a day (29 Nov 2020 21:31)  simvastatin:  orally once a day (at bedtime) (29 Nov 2020 21:31)  traMADol 50 mg oral tablet: 1 tab(s) orally every 6 hours (29 Nov 2020 21:31)    MEDICATIONS  (STANDING):  cefTRIAXone   IVPB      cefTRIAXone   IVPB 1000 milliGRAM(s) IV Intermittent every 24 hours  dextrose 40% Gel 15 Gram(s) Oral once  dextrose 5%. 1000 milliLiter(s) (50 mL/Hr) IV Continuous <Continuous>  dextrose 5%. 1000 milliLiter(s) (100 mL/Hr) IV Continuous <Continuous>  dextrose 50% Injectable 25 Gram(s) IV Push once  dextrose 50% Injectable 12.5 Gram(s) IV Push once  dextrose 50% Injectable 25 Gram(s) IV Push once  DULoxetine 20 milliGRAM(s) Oral two times a day  gabapentin 100 milliGRAM(s) Oral daily  glucagon  Injectable 1 milliGRAM(s) IntraMuscular once  heparin   Injectable 5000 Unit(s) SubCutaneous every 12 hours  influenza   Vaccine 0.5 milliLiter(s) IntraMuscular once  insulin glargine Injectable (LANTUS) 12 Unit(s) SubCutaneous at bedtime  insulin lispro (ADMELOG) corrective regimen sliding scale   SubCutaneous three times a day before meals  insulin lispro (ADMELOG) corrective regimen sliding scale   SubCutaneous at bedtime  lactulose Syrup 10 Gram(s) Oral two times a day  meclizine 25 milliGRAM(s) Oral three times a day  metoprolol tartrate 25 milliGRAM(s) Oral two times a day  polyethylene glycol 3350 17 Gram(s) Oral daily    MEDICATIONS  (PRN):  baclofen 5 milliGRAM(s) Oral two times a day PRN Muscle Spasm  ibuprofen  Tablet. 400 milliGRAM(s) Oral every 8 hours PRN Temp greater or equal to 38C (100.4F), Mild Pain (1 - 3)  morphine  - Injectable 2 milliGRAM(s) IV Push every 6 hours PRN Severe Pain (7 - 10)  ondansetron Injectable 4 milliGRAM(s) IV Push every 6 hours PRN Nausea and/or Vomiting  oxyCODONE    IR 5 milliGRAM(s) Oral every 6 hours PRN Moderate Pain (4 - 6)  traMADol 50 milliGRAM(s) Oral every 6 hours PRN Moderate Pain (4 - 6)    Vital Signs Last 24 Hrs  T(C): 36.8 (25 Jan 2021 17:42), Max: 37.5 (24 Jan 2021 23:13)  T(F): 98.2 (25 Jan 2021 17:42), Max: 99.5 (24 Jan 2021 23:13)  HR: 95 (25 Jan 2021 17:42) (74 - 96)  BP: 119/78 (25 Jan 2021 17:42) (119/73 - 124/81)  BP(mean): --  RR: 18 (25 Jan 2021 17:42) (18 - 18)  SpO2: 96% (25 Jan 2021 17:42) (94% - 96%)    Daily     Daily     01-24-21 @ 07:01  -  01-25-21 @ 07:00  --------------------------------------------------------  IN: 530 mL / OUT: 0 mL / NET: 530 mL      CAPILLARY BLOOD GLUCOSE      POCT Blood Glucose.: 102 mg/dL (25 Jan 2021 11:31)  POCT Blood Glucose.: 82 mg/dL (25 Jan 2021 07:39)  POCT Blood Glucose.: 118 mg/dL (24 Jan 2021 21:17)    PHYSICAL EXAM:      T(C): 36.8 (01-25-21 @ 17:42), Max: 37.5 (01-24-21 @ 23:13)  HR: 95 (01-25-21 @ 17:42) (74 - 96)  BP: 119/78 (01-25-21 @ 17:42) (119/73 - 124/81)  RR: 18 (01-25-21 @ 17:42) (18 - 18)  SpO2: 96% (01-25-21 @ 17:42) (94% - 96%)  Wt(kg): --  Lungs clear decreased BS at bases  Heart S1S2  Abd soft mild distention  Extremities:   1-2  edema              01-24    134<L>  |  101  |  19  ----------------------------<  96  4.3   |  26  |  0.98    Ca    8.2<L>      24 Jan 2021 06:38  Phos  2.7     01-24  Mg     1.9     01-24                            10.0   9.62  )-----------( 184      ( 24 Jan 2021 06:38 )             32.2     Creatinine Trend: 0.98<--, 1.13<--, 1.24<--          Assessment   CKD 2-3  HTN, diabetic risk  NSAIDs hemodynamic effect    Plan  Continue supportive care  Hold NSAIDs  No absolute renal contraindication to MRI with gadolinium, CrCl >30 ml mi; however follow closely with diuresis    Phil Merlos MD

## 2021-01-26 LAB
ANION GAP SERPL CALC-SCNC: 6 MMOL/L — SIGNIFICANT CHANGE UP (ref 5–17)
BUN SERPL-MCNC: 17 MG/DL — SIGNIFICANT CHANGE UP (ref 7–23)
CALCIUM SERPL-MCNC: 8.7 MG/DL — SIGNIFICANT CHANGE UP (ref 8.5–10.1)
CHLORIDE SERPL-SCNC: 101 MMOL/L — SIGNIFICANT CHANGE UP (ref 96–108)
CO2 SERPL-SCNC: 27 MMOL/L — SIGNIFICANT CHANGE UP (ref 22–31)
CREAT SERPL-MCNC: 0.9 MG/DL — SIGNIFICANT CHANGE UP (ref 0.5–1.3)
GLUCOSE BLDC GLUCOMTR-MCNC: 104 MG/DL — HIGH (ref 70–99)
GLUCOSE BLDC GLUCOMTR-MCNC: 107 MG/DL — HIGH (ref 70–99)
GLUCOSE BLDC GLUCOMTR-MCNC: 128 MG/DL — HIGH (ref 70–99)
GLUCOSE BLDC GLUCOMTR-MCNC: 75 MG/DL — SIGNIFICANT CHANGE UP (ref 70–99)
GLUCOSE SERPL-MCNC: 72 MG/DL — SIGNIFICANT CHANGE UP (ref 70–99)
HCT VFR BLD CALC: 30 % — LOW (ref 34.5–45)
HGB BLD-MCNC: 9.8 G/DL — LOW (ref 11.5–15.5)
MAGNESIUM SERPL-MCNC: 1.5 MG/DL — LOW (ref 1.6–2.6)
MCHC RBC-ENTMCNC: 24.3 PG — LOW (ref 27–34)
MCHC RBC-ENTMCNC: 32.7 GM/DL — SIGNIFICANT CHANGE UP (ref 32–36)
MCV RBC AUTO: 74.3 FL — LOW (ref 80–100)
NRBC # BLD: 0 /100 WBCS — SIGNIFICANT CHANGE UP (ref 0–0)
PHOSPHATE SERPL-MCNC: 2.9 MG/DL — SIGNIFICANT CHANGE UP (ref 2.5–4.5)
PLATELET # BLD AUTO: 178 K/UL — SIGNIFICANT CHANGE UP (ref 150–400)
POTASSIUM SERPL-MCNC: 4 MMOL/L — SIGNIFICANT CHANGE UP (ref 3.5–5.3)
POTASSIUM SERPL-SCNC: 4 MMOL/L — SIGNIFICANT CHANGE UP (ref 3.5–5.3)
RBC # BLD: 4.04 M/UL — SIGNIFICANT CHANGE UP (ref 3.8–5.2)
RBC # FLD: 19.1 % — HIGH (ref 10.3–14.5)
SODIUM SERPL-SCNC: 134 MMOL/L — LOW (ref 135–145)
WBC # BLD: 11.39 K/UL — HIGH (ref 3.8–10.5)
WBC # FLD AUTO: 11.39 K/UL — HIGH (ref 3.8–10.5)

## 2021-01-26 PROCEDURE — 99232 SBSQ HOSP IP/OBS MODERATE 35: CPT

## 2021-01-26 RX ADMIN — HEPARIN SODIUM 5000 UNIT(S): 5000 INJECTION INTRAVENOUS; SUBCUTANEOUS at 05:50

## 2021-01-26 RX ADMIN — LACTULOSE 10 GRAM(S): 10 SOLUTION ORAL at 18:26

## 2021-01-26 RX ADMIN — LACTULOSE 10 GRAM(S): 10 SOLUTION ORAL at 05:51

## 2021-01-26 RX ADMIN — GABAPENTIN 100 MILLIGRAM(S): 400 CAPSULE ORAL at 12:23

## 2021-01-26 RX ADMIN — INSULIN GLARGINE 12 UNIT(S): 100 INJECTION, SOLUTION SUBCUTANEOUS at 21:43

## 2021-01-26 RX ADMIN — DULOXETINE HYDROCHLORIDE 20 MILLIGRAM(S): 30 CAPSULE, DELAYED RELEASE ORAL at 18:26

## 2021-01-26 RX ADMIN — Medication 400 MILLIGRAM(S): at 16:21

## 2021-01-26 RX ADMIN — POLYETHYLENE GLYCOL 3350 17 GRAM(S): 17 POWDER, FOR SOLUTION ORAL at 12:23

## 2021-01-26 RX ADMIN — CEFTRIAXONE 100 MILLIGRAM(S): 500 INJECTION, POWDER, FOR SOLUTION INTRAMUSCULAR; INTRAVENOUS at 12:23

## 2021-01-26 RX ADMIN — HEPARIN SODIUM 5000 UNIT(S): 5000 INJECTION INTRAVENOUS; SUBCUTANEOUS at 18:26

## 2021-01-26 RX ADMIN — Medication 25 MILLIGRAM(S): at 05:50

## 2021-01-26 RX ADMIN — Medication 25 MILLIGRAM(S): at 21:43

## 2021-01-26 RX ADMIN — DULOXETINE HYDROCHLORIDE 20 MILLIGRAM(S): 30 CAPSULE, DELAYED RELEASE ORAL at 05:50

## 2021-01-26 RX ADMIN — Medication 25 MILLIGRAM(S): at 13:38

## 2021-01-26 NOTE — PROGRESS NOTE ADULT - PROBLEM SELECTOR PLAN 6
will hydrate and observe  1/23/2021 improving slowly continue with ivf, check labs in am  1/24/2021 improving  1/26/2021 seen by renal who feels pt has ckd stage 2

## 2021-01-26 NOTE — PROGRESS NOTE ADULT - SUBJECTIVE AND OBJECTIVE BOX
Patient is a 75y old  Female who presents with a chief complaint of short of breath (21 Jan 2021 18:18)      OVERNIGHT EVENTS: none    MEDICATIONS  (STANDING):  cefTRIAXone   IVPB      cefTRIAXone   IVPB 1000 milliGRAM(s) IV Intermittent every 24 hours  dextrose 40% Gel 15 Gram(s) Oral once  dextrose 5%. 1000 milliLiter(s) (50 mL/Hr) IV Continuous <Continuous>  dextrose 5%. 1000 milliLiter(s) (100 mL/Hr) IV Continuous <Continuous>  dextrose 50% Injectable 25 Gram(s) IV Push once  dextrose 50% Injectable 12.5 Gram(s) IV Push once  dextrose 50% Injectable 25 Gram(s) IV Push once  DULoxetine 20 milliGRAM(s) Oral two times a day  gabapentin 100 milliGRAM(s) Oral daily  glucagon  Injectable 1 milliGRAM(s) IntraMuscular once  heparin   Injectable 5000 Unit(s) SubCutaneous every 12 hours  influenza   Vaccine 0.5 milliLiter(s) IntraMuscular once  insulin glargine Injectable (LANTUS) 12 Unit(s) SubCutaneous at bedtime  insulin lispro (ADMELOG) corrective regimen sliding scale   SubCutaneous three times a day before meals  insulin lispro (ADMELOG) corrective regimen sliding scale   SubCutaneous at bedtime  lactulose Syrup 10 Gram(s) Oral two times a day  meclizine 25 milliGRAM(s) Oral three times a day  metoprolol tartrate 25 milliGRAM(s) Oral two times a day  polyethylene glycol 3350 17 Gram(s) Oral daily    MEDICATIONS  (PRN):  baclofen 5 milliGRAM(s) Oral two times a day PRN Muscle Spasm  ibuprofen  Tablet. 400 milliGRAM(s) Oral every 8 hours PRN Temp greater or equal to 38C (100.4F), Mild Pain (1 - 3)  morphine  - Injectable 2 milliGRAM(s) IV Push every 6 hours PRN Severe Pain (7 - 10)  ondansetron Injectable 4 milliGRAM(s) IV Push every 6 hours PRN Nausea and/or Vomiting  oxyCODONE    IR 5 milliGRAM(s) Oral every 6 hours PRN Moderate Pain (4 - 6)  traMADol 50 milliGRAM(s) Oral every 6 hours PRN Moderate Pain (4 - 6)        Allergies    No Known Allergies    Intolerances        SUBJECTIVE: in bed in NAD, no acute events overnight     Vital Signs Last 24 Hrs  T(C): 36.8 (26 Jan 2021 05:55), Max: 37.6 (25 Jan 2021 23:34)  T(F): 98.2 (26 Jan 2021 05:55), Max: 99.6 (25 Jan 2021 23:34)  HR: 99 (26 Jan 2021 05:55) (74 - 102)  BP: 123/84 (26 Jan 2021 05:55) (119/73 - 137/83)  BP(mean): --  RR: 18 (26 Jan 2021 05:55) (18 - 18)  SpO2: 95% (26 Jan 2021 05:55) (94% - 96%)      PHYSICAL EXAM:  GENERAL: NAD, well-groomed, well-developed  HEAD:  Atraumatic, Normocephalic  EYES: EOMI, PERRLA, conjunctiva and sclera clear  ENMT: No tonsillar erythema, exudates, or enlargement; Moist mucous membranes, Good dentition, No lesions  NECK: Supple, No JVD, Normal thyroid  CHEST/LUNG: Clear to  auscultation bilaterally; No rales, rhonchi, wheezing, or rubs  bilaterally  HEART: Regular rate and rhythm; No murmurs, rubs, or gallops  ABDOMEN: Soft, epigastric tenderness, Nondistended; Bowel sounds present  EXTREMITIES:  2+ Peripheral Pulses, No clubbing, cyanosis, or edema BL LE  SKIN: No rashes or lesions  NERVOUS SYSTEM:  Alert & Oriented X3, Good concentration; Motor Strength 5/5 B/L upper and lower extremities;   DTRs 2+ intact and symmetric, sensation intact BL    LABS:                                     9.8    11.39 )-----------( 178      ( 26 Jan 2021 08:25 )             30.0   01-26    134<L>  |  101  |  17  ----------------------------<  72  4.0   |  27  |  0.90    Ca    8.7      26 Jan 2021 08:25  Phos  2.9     01-26  Mg     1.5     01-26         PTT - ( 21 Jan 2021 12:26 )  PTT:34.0 sec  Urinalysis Basic - ( 21 Jan 2021 17:09 )      Culture - Blood (collected 22 Jan 2021 00:46)  Source: .Blood Blood-Peripheral  Preliminary Report (23 Jan 2021 01:05):    No growth to date.    Culture - Blood (collected 22 Jan 2021 00:35)  Source: .Blood Blood-Peripheral  Preliminary Report (23 Jan 2021 01:05):    No growth to date.    Culture - Urine (collected 22 Jan 2021 00:34)  Source: .Urine Clean Catch (Midstream)  Preliminary Report (23 Jan 2021 05:48):    >100,000 CFU/ml Gram Negative Rods        Cultures;     CAPILLARY BLOOD GLUCOSE        POCT Blood Glucose.: 107 mg/dL (26 Jan 2021 10:34)  POCT Blood Glucose.: 75 mg/dL (26 Jan 2021 07:49)  POCT Blood Glucose.: 102 mg/dL (25 Jan 2021 22:25)  POCT Blood Glucose.: 102 mg/dL (25 Jan 2021 11:31)    Lipid panel:     CARDIAC MARKERS ( 21 Jan 2021 12:26 )  <.015 ng/mL / x     / 606 U/L / x     / <1.0 ng/mL        RADIOLOGY & ADDITIONAL TESTS:    < from: MR Head No Cont (01.25.21 @ 10:21) >    IMPRESSION:  Limited study. No acute infarction.  < from: Xray Chest 1 View- PORTABLE-Routine (Xray Chest 1 View- PORTABLE-Routine .) (01.25.21 @ 17:30) >      IMPRESSION:   Mild RIGHT pleural effusion..        < end of copied text >  < from: US Abdomen Limited (01.25.21 @ 16:53) >  IMPRESSION:  There is no ascites.    Partially imaged diffuse hepatic metastasis.      < end of copied text >            < end of copied text >    Imaging Personally Reviewed:  [ x] YES      Consultant(s) Notes Reviewed:  [x ] YES     Care Discussed with [x ] Consultants [X ] Patient [x ] Family  [x ]    [x ]  Other; RN

## 2021-01-26 NOTE — PROGRESS NOTE ADULT - PROBLEM SELECTOR PROBLEM 5
Carcinoma of right breast metastatic to liver

## 2021-01-26 NOTE — PROGRESS NOTE ADULT - PROBLEM SELECTOR PROBLEM 6
DELMAR (acute kidney injury)

## 2021-01-26 NOTE — PROGRESS NOTE ADULT - ATTENDING COMMENTS
d/w daughter in detail and patient and agree with poc
d/w daughter in detail and patient and agree with poc
d/w daughter in detail and patient and agree with poc  1/25/2021 d/w SW and cm t f/u with patient and daughter regarding Hospice-vs [facility]
d/w daughter in detail and patient and agree with poc  1/25/2021 d/w SW and cm t f/u with patient and daughter regarding Hospice-vs [facility]
d/w daughter in detail and patient and agree with poc

## 2021-01-26 NOTE — HOSPICE CARE NOTE - CONVESATION DETAILS
Call placed to pt's dtr [phone]. Unable to reach. Left her a v/m requesting a call to discuss home hospice services. Will cont to f/u.     Ofelia Yepez Rn
Spoke with pt's dtr. Hospice services and POC discussed. She is in agreement. Hospice consents was emailed. She confirmed receipt and will give me a call this afternoon to review. DME has been ordered. Transport w/c and commode. Scheduled to be delivered tomorrow. Dtr states she is currently at work and will get off at 7pm. She will prepare for her mothers return home tomorrow. She will transport her mother home by car once equipment is delivered. Spoke with ESTHER Hall and she is aware of the above.     Ofelia Yepez Rn

## 2021-01-26 NOTE — PROGRESS NOTE ADULT - ASSESSMENT
75 years old female with history of breast cancer with jaundice with short of breath with infiltrate    IMPROVE VTE Individual Risk Assessment          RISK                                                          Points  [  ] Previous VTE                                                3  [  ] Thrombophilia                                             2  [  ] Lower limb paralysis                                   2        (unable to hold up >15 seconds)    [  ] Current Cancer                                             2         (within 6 months)  [  ] Immobilization > 24 hrs                              1  [  ] ICU/CCU stay > 24 hours                             1  [  ] Age > 60                                                         1    IMPROVE VTE Score: 2        
Suspicion for pneumonia her is very low.  No recent chemotherapy  Patient afebrile and HD stable  Physical exam unimpressive for pneumonia  WBC similar to 11/2020 and could be related to her malignancy as well  CT (personally reviewed) similarly not impressive for pneumonia  I do not think she merits antibiotics at this time.    1/25: No concern of pneumonia at present. Started on antibiotics by primary team for symptomatic cystitis. No concern of pyelonephritis and patient without urinary complaints at this time.    Suggestions--  Transition to PO antibiotics vs cysitis- 5-day total course    D/W Dr. Soto      Thank you for the courtesy of this referral. I'll sign off at this time.   Jeffry Rosas MD  Attending Physician  [facility]  Division of Infectious Diseases  [phone]  
75 years old female with history of breast cancer with jaundice with short of breath with infiltrate    IMPROVE VTE Individual Risk Assessment          RISK                                                          Points  [  ] Previous VTE                                                3  [  ] Thrombophilia                                             2  [  ] Lower limb paralysis                                   2        (unable to hold up >15 seconds)    [  ] Current Cancer                                             2         (within 6 months)  [  ] Immobilization > 24 hrs                              1  [  ] ICU/CCU stay > 24 hours                             1  [  ] Age > 60                                                         1    IMPROVE VTE Score: 2

## 2021-01-26 NOTE — PROGRESS NOTE ADULT - PROBLEM SELECTOR PLAN 2
POA and due to the above  1/24/2021 I started antibx on 1/23/2021 fir Urinary Tract Infection: pt c/o of frequency and discoloration  and urine cx positive for e coli  1/2/25/2021 1/26/2021 improved sx

## 2021-01-27 ENCOUNTER — TRANSCRIPTION ENCOUNTER (OUTPATIENT)
Age: 76
End: 2021-01-27

## 2021-01-27 VITALS
RESPIRATION RATE: 16 BRPM | OXYGEN SATURATION: 100 % | HEART RATE: 87 BPM | DIASTOLIC BLOOD PRESSURE: 77 MMHG | SYSTOLIC BLOOD PRESSURE: 117 MMHG | TEMPERATURE: 98 F

## 2021-01-27 LAB
CULTURE RESULTS: SIGNIFICANT CHANGE UP
CULTURE RESULTS: SIGNIFICANT CHANGE UP
GLUCOSE BLDC GLUCOMTR-MCNC: 133 MG/DL — HIGH (ref 70–99)
GLUCOSE BLDC GLUCOMTR-MCNC: 164 MG/DL — HIGH (ref 70–99)
GLUCOSE BLDC GLUCOMTR-MCNC: 93 MG/DL — SIGNIFICANT CHANGE UP (ref 70–99)
SPECIMEN SOURCE: SIGNIFICANT CHANGE UP
SPECIMEN SOURCE: SIGNIFICANT CHANGE UP

## 2021-01-27 PROCEDURE — 99239 HOSP IP/OBS DSCHRG MGMT >30: CPT | Mod: GC

## 2021-01-27 RX ORDER — MAGNESIUM SULFATE 500 MG/ML
2 VIAL (ML) INJECTION ONCE
Refills: 0 | Status: COMPLETED | OUTPATIENT
Start: 2021-01-27 | End: 2021-01-27

## 2021-01-27 RX ORDER — METOPROLOL TARTRATE 50 MG
1 TABLET ORAL
Qty: 60 | Refills: 0
Start: 2021-01-27 | End: 2021-02-25

## 2021-01-27 RX ORDER — TRAMADOL HYDROCHLORIDE 50 MG/1
1 TABLET ORAL
Qty: 0 | Refills: 0 | DISCHARGE

## 2021-01-27 RX ORDER — OXYCODONE HYDROCHLORIDE 5 MG/1
1 TABLET ORAL
Qty: 120 | Refills: 0
Start: 2021-01-27 | End: 2021-02-25

## 2021-01-27 RX ORDER — GABAPENTIN 400 MG/1
1 CAPSULE ORAL
Qty: 0 | Refills: 0 | DISCHARGE
Start: 2021-01-27

## 2021-01-27 RX ORDER — METFORMIN HYDROCHLORIDE 850 MG/1
1 TABLET ORAL
Qty: 0 | Refills: 0 | DISCHARGE

## 2021-01-27 RX ORDER — POLYETHYLENE GLYCOL 3350 17 G/17G
17 POWDER, FOR SOLUTION ORAL
Qty: 510 | Refills: 0
Start: 2021-01-27 | End: 2021-02-25

## 2021-01-27 RX ORDER — METFORMIN HYDROCHLORIDE 850 MG/1
1 TABLET ORAL
Qty: 30 | Refills: 0
Start: 2021-01-27 | End: 2021-02-25

## 2021-01-27 RX ADMIN — LACTULOSE 10 GRAM(S): 10 SOLUTION ORAL at 05:02

## 2021-01-27 RX ADMIN — POLYETHYLENE GLYCOL 3350 17 GRAM(S): 17 POWDER, FOR SOLUTION ORAL at 11:24

## 2021-01-27 RX ADMIN — Medication 25 MILLIGRAM(S): at 05:02

## 2021-01-27 RX ADMIN — GABAPENTIN 100 MILLIGRAM(S): 400 CAPSULE ORAL at 11:24

## 2021-01-27 RX ADMIN — CEFTRIAXONE 100 MILLIGRAM(S): 500 INJECTION, POWDER, FOR SOLUTION INTRAMUSCULAR; INTRAVENOUS at 11:25

## 2021-01-27 RX ADMIN — Medication 25 MILLIGRAM(S): at 17:25

## 2021-01-27 RX ADMIN — HEPARIN SODIUM 5000 UNIT(S): 5000 INJECTION INTRAVENOUS; SUBCUTANEOUS at 17:25

## 2021-01-27 RX ADMIN — Medication 1: at 11:24

## 2021-01-27 RX ADMIN — DULOXETINE HYDROCHLORIDE 20 MILLIGRAM(S): 30 CAPSULE, DELAYED RELEASE ORAL at 17:25

## 2021-01-27 RX ADMIN — Medication 25 MILLIGRAM(S): at 15:00

## 2021-01-27 RX ADMIN — HEPARIN SODIUM 5000 UNIT(S): 5000 INJECTION INTRAVENOUS; SUBCUTANEOUS at 05:02

## 2021-01-27 RX ADMIN — Medication 50 GRAM(S): at 11:24

## 2021-01-27 RX ADMIN — DULOXETINE HYDROCHLORIDE 20 MILLIGRAM(S): 30 CAPSULE, DELAYED RELEASE ORAL at 05:02

## 2021-01-27 NOTE — DISCHARGE NOTE PROVIDER - NSDCCPCAREPLAN_GEN_ALL_CORE_FT
PRINCIPAL DISCHARGE DIAGNOSIS  Diagnosis: Pneumonia  Assessment and Plan of Treatment: Id felt other wise and no pna      SECONDARY DISCHARGE DIAGNOSES  Diagnosis: Acute UTI  Assessment and Plan of Treatment:     Diagnosis: DELMAR (acute kidney injury)  Assessment and Plan of Treatment: resolved    Diagnosis: Sepsis without acute organ dysfunction, due to unspecified organism  Assessment and Plan of Treatment: Sepsis without acute organ dysfunction, due to unspecified organism    Diagnosis: Dizziness  Assessment and Plan of Treatment: Dizziness    Diagnosis: Carcinoma of right breast metastatic to liver  Assessment and Plan of Treatment:

## 2021-01-27 NOTE — DISCHARGE NOTE NURSING/CASE MANAGEMENT/SOCIAL WORK - PATIENT PORTAL LINK FT
You can access the FollowMyHealth Patient Portal offered by [facility] by registering at the following website: [email or web address]. By joining [employer]’s FollowMyHealth portal, you will also be able to view your health information using other applications (apps) compatible with our system.

## 2021-01-27 NOTE — DISCHARGE NOTE PROVIDER - NSDCMRMEDTOKEN_GEN_ALL_CORE_FT
baclofen 10 mg oral tablet: 1 tab(s) orally 2 times a day, As Needed - for muscle spasm  cephalexin 500 mg oral capsule: 1 cap(s) orally every 12 hours   DULoxetine 20 mg oral delayed release capsule: 1 cap(s) orally 2 times a day  gabapentin:  orally   ibuprofen 600 mg oral tablet: 1 tab(s) orally every 6 hours as needed for pain  meclizine 25 mg oral tablet: 1 tab(s) orally 3 times a day  meloxicam 15 mg oral tablet: 1 tab(s) orally once a day, As Needed - for mild pain  metFORMIN 850 mg oral tablet: 1 tab(s) orally once a day (in the morning)  metoprolol:  orally once a day  oxyCODONE-acetaminophen 5 mg-325 mg oral tablet: 1 tab(s) orally 3 times a day MDD:3  simvastatin:  orally once a day (at bedtime)  traMADol 50 mg oral tablet: 1 tab(s) orally every 6 hours   baclofen 10 mg oral tablet: 1 tab(s) orally 2 times a day, As Needed - for muscle spasm  DULoxetine 20 mg oral delayed release capsule: 1 cap(s) orally 2 times a day  gabapentin 100 mg oral capsule: 1 cap(s) orally once a day  meclizine 25 mg oral tablet: 1 tab(s) orally 3 times a day  metFORMIN 850 mg oral tablet: 1 tab(s) orally once a day (in the morning)  metoprolol tartrate 25 mg oral tablet: 1 tab(s) orally 2 times a day  oxyCODONE 5 mg oral tablet: 1 tab(s) orally every 6 hours -sever pain 7-10  MDD:4   polyethylene glycol 3350 oral powder for reconstitution: 17 gram(s) orally once a day

## 2021-01-27 NOTE — DISCHARGE NOTE PROVIDER - HOSPITAL COURSE
Problem/Plan - 1:  ·  Problem: Pneumonia of right lower lobe due to infectious organism.  Plan: antibiotics   f/u cultures   1/23/2021 blood cx ngtd, urine cx GNR. will start antibx ..and monitor final results  1/24/2021 per ID declined antibx for pna.      Problem/Plan - 2:  ·  Problem: Sepsis without acute organ dysfunction, due to unspecified organism.  Plan: POA and due to the above  1/24/2021 I started antibx on 1/23/2021 fir Urinary Tract Infection: pt c/o of frequency and discoloration  and urine cx positive for e coli  1/2/25/2021 1/26/2021 improved sx.      Problem/Plan - 3:  ·  Problem: Type 2 diabetes mellitus without complication, with long-term current use of insulin.  Plan: riss   was on metformin at home pending resolution of mild DELMAR- will resume metformin at discharge.      Problem/Plan - 4:  ·  Problem: Essential hypertension.  Plan: continue home meds.      Problem/Plan - 5:  ·  Problem: Carcinoma of right breast metastatic to liver.  Plan: DNR/DNI per [facility]   mets to liver appear worse and presumed lung mets and/or PNA   Hospice- eval per daughter's request   with abd pian presumably secondary to mets will start pain meds.      Problem/Plan - 6:  Problem: DELMAR (acute kidney injury). Plan: will hydrate and observe  1/23/2021 improving slowly continue with ivf, check labs in am  1/24/2021 improving  1/26/2021 seen by renal who feels pt has ckd stage 2, delmar-resolved.     Problem/Plan - 7:  ·  Problem: Dizziness.  Plan: per daughter was diagnosis  with " Vertigo- 6 weeks ago was  started on meclizine but dizziness is worse.   given h/o mets.  will get MRI but would like to get with yun . will hydrate and monitor creatinine if improved creatinine will get MRI w/ and w/o over the weekend if not will then just do without  1/24/201 f/u mri  1/25/2021 mri unremarkable.     Attending Attestation:   d/w daughter in detail and patient and agree with poc  1/25/2021 d/w SW and cm t f/u with patient and daughter regarding Hospice-vs [facility].   discharge with home Hospice  TIME SPENT COMPLETING DISCHARGE AND COORDINATING CARE WITH NURSING,  AND CASE MANAGEMENT APPROX 40MINUTES   I stop completed  #[id number]        Problem/Plan - 1:  ·  Problem: Pneumonia of right lower lobe due to infectious organism.  Plan: antibiotics   f/u cultures   1/23/2021 blood cx ngtd, urine cx GNR. will start antibx ..and monitor final results  1/24/2021 per ID declined antibx for pna.      Problem/Plan - 2:  ·  Problem: Sepsis without acute organ dysfunction, due to unspecified organism.  Plan: POA and due to the above  1/24/2021 I started antibx on 1/23/2021 fir Urinary Tract Infection: pt c/o of frequency and discoloration  and urine cx positive for e coli  1/2/25/2021 1/26/2021 improved sx.      Problem/Plan - 3:  ·  Problem: Type 2 diabetes mellitus without complication, with long-term current use of insulin.  Plan: riss   was on metformin at home pending resolution of mild DELMAR- will resume metformin at discharge.      Problem/Plan - 4:  ·  Problem: Essential hypertension.  Plan: continue home meds.      Problem/Plan - 5:  ·  Problem: Carcinoma of right breast metastatic to liver.  Plan: DNR/DNI per [facility]   mets to liver appear worse and presumed lung mets and/or PNA   Hospice- eval per daughter's request   with abd pian presumably secondary to mets will start pain meds.      Problem/Plan - 6:  Problem: DELMAR (acute kidney injury). Plan: will hydrate and observe  1/23/2021 improving slowly continue with ivf, check labs in am  1/24/2021 improving  1/26/2021 seen by renal who feels pt has ckd stage 2, delmar-resolved.     Problem/Plan - 7:  ·  Problem: Dizziness.  Plan: per daughter was diagnosis  with " Vertigo- 6 weeks ago was  started on meclizine but dizziness is worse.   given h/o mets.  will get MRI but would like to get with yun . will hydrate and monitor creatinine if improved creatinine will get MRI w/ and w/o over the weekend if not will then just do without  1/24/201 f/u mri  1/25/2021 mri unremarkable.     Attending Attestation:   d/w daughter in detail and patient and agree with poc  1/25/2021 d/w SW and cm t f/u with patient and daughter regarding Hospice-vs [facility].   discharge with home Hospice    TIME SPENT COMPLETING DISCHARGE AND COORDINATING CARE WITH NURSING,  AND CASE MANAGEMENT APPROX 40MINUTES   I stop completed  #[id number]

## 2021-02-03 DIAGNOSIS — Z92.21 PERSONAL HISTORY OF ANTINEOPLASTIC CHEMOTHERAPY: ICD-10-CM

## 2021-02-03 DIAGNOSIS — C50.911 MALIGNANT NEOPLASM OF UNSPECIFIED SITE OF RIGHT FEMALE BREAST: ICD-10-CM

## 2021-02-03 DIAGNOSIS — I12.9 HYPERTENSIVE CHRONIC KIDNEY DISEASE WITH STAGE 1 THROUGH STAGE 4 CHRONIC KIDNEY DISEASE, OR UNSPECIFIED CHRONIC KIDNEY DISEASE: ICD-10-CM

## 2021-02-03 DIAGNOSIS — R17 UNSPECIFIED JAUNDICE: ICD-10-CM

## 2021-02-03 DIAGNOSIS — R06.02 SHORTNESS OF BREATH: ICD-10-CM

## 2021-02-03 DIAGNOSIS — Z79.1 LONG TERM (CURRENT) USE OF NON-STEROIDAL ANTI-INFLAMMATORIES (NSAID): ICD-10-CM

## 2021-02-03 DIAGNOSIS — A41.9 SEPSIS, UNSPECIFIED ORGANISM: ICD-10-CM

## 2021-02-03 DIAGNOSIS — N17.9 ACUTE KIDNEY FAILURE, UNSPECIFIED: ICD-10-CM

## 2021-02-03 DIAGNOSIS — Z79.891 LONG TERM (CURRENT) USE OF OPIATE ANALGESIC: ICD-10-CM

## 2021-02-03 DIAGNOSIS — N18.2 CHRONIC KIDNEY DISEASE, STAGE 2 (MILD): ICD-10-CM

## 2021-02-03 DIAGNOSIS — C78.7 SECONDARY MALIGNANT NEOPLASM OF LIVER AND INTRAHEPATIC BILE DUCT: ICD-10-CM

## 2021-02-03 DIAGNOSIS — B96.20 UNSPECIFIED ESCHERICHIA COLI [E. COLI] AS THE CAUSE OF DISEASES CLASSIFIED ELSEWHERE: ICD-10-CM

## 2021-02-03 DIAGNOSIS — Z92.3 PERSONAL HISTORY OF IRRADIATION: ICD-10-CM

## 2021-02-03 DIAGNOSIS — N39.0 URINARY TRACT INFECTION, SITE NOT SPECIFIED: ICD-10-CM

## 2021-02-03 DIAGNOSIS — Z66 DO NOT RESUSCITATE: ICD-10-CM

## 2021-02-03 DIAGNOSIS — E11.22 TYPE 2 DIABETES MELLITUS WITH DIABETIC CHRONIC KIDNEY DISEASE: ICD-10-CM

## 2021-03-08 NOTE — ED ADULT NURSE NOTE - AS SC [facility]
None
Renal progress Note NAME:  Judy Santamaria :   1953 MRN:   [id number] Date/Time:  3/8/2021 1:08 PM 
 
 
Subjective:  
Patient seen in the room. Seems tachypneic. S/p UF tx today but did not tolerate well, no edema Objective: VITALS:   
Visit Vitals BP (!) 126/59 (BP 1 Location: Right lower arm, BP Patient Position: At rest) Pulse 83 Temp 97.1 °F (36.2 °C) Resp 16 Ht 5' 3\" (1.6 m) Wt 87.3 kg (192 lb 7.4 oz) SpO2 96% BMI 34.09 kg/m² Temp (24hrs), Av.3 °F (36.3 °C), Min:96.3 °F (35.7 °C), Max:98.4 °F (36.9 °C) PHYSICAL EXAM:  
General: alert awake well-oriented, no acute distress. HEENT: EOMI, no Icterus, no Pallor, pupils reactive, Neck: Neck is supple, No JVD, no thyromegaly,  
Lungs: fair air entry+, few rales+ CVS: heart sounds normal, regular rate and rhythm, no murmurs, no rubs. GI: soft, nontender, normal BS, Extremeties: no cyanosis, no edema, Neuro: Alert, awake, oriented x3, speech is normal  
Skin: normal skin turgor, no skin rashes LAB DATA REVIEWED:   
Recent Results (from the past 24 hour(s)) CBC WITH AUTOMATED DIFF Collection Time: 21  3:59 PM  
Result Value Ref Range WBC 21.1 (H) 3.6 - 11.0 K/uL  
 RBC 3.58 (L) 3.80 - 5.20 M/uL  
 HGB 10.9 (L) 11.5 - 16.0 g/dL HCT 33.3 (L) 35.0 - 47.0 % MCV 93.0 80.0 - 99.0 FL  
 MCH 30.4 26.0 - 34.0 PG  
 MCHC 32.7 30.0 - 36.5 g/dL  
 RDW 15.2 (H) 11.5 - 14.5 % PLATELET 680 366 - 197 K/uL MPV 11.7 8.9 - 12.9 FL  
 NEUTROPHILS 81 (H) 32 - 75 % LYMPHOCYTES 9 (L) 12 - 49 % MONOCYTES 7 5 - 13 % EOSINOPHILS 1 0 - 7 % BASOPHILS 0 0 - 1 % IMMATURE GRANULOCYTES 2 (H) 0.0 - 0.5 % ABS. NEUTROPHILS 17.5 (H) 1.8 - 8.0 K/UL  
 ABS. LYMPHOCYTES 2.0 0.8 - 3.5 K/UL  
 ABS. MONOCYTES 1.5 (H) 0.0 - 1.0 K/UL  
 ABS. EOSINOPHILS 0.2 0.0 - 0.4 K/UL  
 ABS. BASOPHILS 0.1 0.0 - 0.1 K/UL  
 ABS. IMM. GRANS. 0.4 (H) 0.00 - 0.04 K/UL  
 DF AUTOMATED METABOLIC PANEL, BASIC  

Collection Time: 03/07/21  3:59 PM  
Result Value Ref Range Sodium 129 (L) 136 - 145 mmol/L Potassium 5.0 3.5 - 5.1 mmol/L Chloride 94 (L) 97 - 108 mmol/L  
 CO2 27 21 - 32 mmol/L Anion gap 8 5 - 15 mmol/L Glucose 193 (H) 65 - 100 mg/dL BUN 31 (H) 6 - 20 mg/dL Creatinine 4.94 (H) 0.55 - 1.02 mg/dL BUN/Creatinine ratio 6 (L) 12 - 20 GFR est AA 11 (L) >60 ml/min/1.73m2 GFR est non-AA 9 (L) >60 ml/min/1.73m2 Calcium 9.1 8.5 - 10.1 mg/dL GLUCOSE, POC Collection Time: 03/07/21  4:06 PM  
Result Value Ref Range Glucose (POC) 208 (H) 65 - 100 mg/dL Performed by Vinny Chase, POC Collection Time: 03/07/21  7:57 PM  
Result Value Ref Range Glucose (POC) 178 (H) 65 - 100 mg/dL Performed by Katiana King Collection Time: 03/08/21  8:00 AM  
Result Value Ref Range Sodium 126 (L) 136 - 145 mmol/L Potassium 5.6 (H) 3.5 - 5.1 mmol/L Chloride 92 (L) 97 - 108 mmol/L  
 CO2 22 21 - 32 mmol/L Anion gap 12 5 - 15 mmol/L Glucose 277 (H) 65 - 100 mg/dL BUN 39 (H) 6 - 20 mg/dL Creatinine 5.73 (H) 0.55 - 1.02 mg/dL BUN/Creatinine ratio 7 (L) 12 - 20 GFR est AA 9 (L) >60 ml/min/1.73m2 GFR est non-AA 7 (L) >60 ml/min/1.73m2 Calcium 8.7 8.5 - 10.1 mg/dL CBC WITH AUTOMATED DIFF Collection Time: 03/08/21  8:00 AM  
Result Value Ref Range WBC 26.4 (H) 3.6 - 11.0 K/uL  
 RBC 3.19 (L) 3.80 - 5.20 M/uL HGB 9.4 (L) 11.5 - 16.0 g/dL HCT 29.2 (L) 35.0 - 47.0 % MCV 91.5 80.0 - 99.0 FL  
 MCH 29.5 26.0 - 34.0 PG  
 MCHC 32.2 30.0 - 36.5 g/dL  
 RDW 15.2 (H) 11.5 - 14.5 % PLATELET 815 189 - 552 K/uL MPV 11.9 8.9 - 12.9 FL  
 NEUTROPHILS 87 (H) 32 - 75 % LYMPHOCYTES 7 (L) 12 - 49 % MONOCYTES 5 5 - 13 % EOSINOPHILS 0 0 - 7 % BASOPHILS 0 0 - 1 % IMMATURE GRANULOCYTES 1 (H) 0.0 - 0.5 % ABS. NEUTROPHILS 23.3 (H) 1.8 - 8.0 K/UL  
 ABS. LYMPHOCYTES 1.8 0.8 - 3.5 K/UL
ABS. MONOCYTES 1.3 (H) 0.0 - 1.0 K/UL  
 ABS. EOSINOPHILS 0.0 0.0 - 0.4 K/UL  
 ABS. BASOPHILS 0.0 0.0 - 0.1 K/UL  
 ABS. IMM. GRANS. 0.2 (H) 0.00 - 0.04 K/UL  
 DF AUTOMATED    
GLUCOSE, POC Collection Time: 03/08/21 11:27 AM  
Result Value Ref Range Glucose (POC) 293 (H) 65 - 100 mg/dL Performed by [facility] Recommendations/Plan:  
#1 ESRD: on HD at [facility] every TTS 
-She was last dialyzed on 3/6 with 2K bath with 2 L fluid removal 
-No uremia, trie a UF treatment today but did not tolerate SOB is probably related to her pneumonia, clinically no evidence of fluid overload, no peripheral edema We will repeat chest x-ray tomorrow Continue antibiotics as per ID We will repeat hemodialysis tomorrow and continue on TTS 2. Hyperkalemia: Secondary to end-stage renal disease Continue renal diet We will give 1 dose of Kayexalate 30 g p.o. today We will repeat hemodialysis tomorrow #3 renal osteodystrophy 
-Not noted to be on any phosphorus binders. will check phosphorus with next labs. Obtain PTH from outpatient and continue Zemplar if indicated 
-Calcium is okay #3 anemia acute on chronic 
-hemoglobin yesterday was 6.9, -She received blood and repeat Hb is 8.4 
-Continue Procrit with dialysis #4 sepsis 
-Presented with fever over 103, leukocytosis and elevated lactic acid 
-Positive blood cultures with gram-positive cocci in clusters 
with MRSA 
-Possible source is infected left arm AV graft 
-ID following, continue IV antibiotics as per ID Persistent bacteremia: multiple BCs since 2/25 are postive, lateest from 3/6 Highly suspicious for AVG infection Patient had previous episode of infected AV graft requiring removal 
She may need graft removal again ,  
Discussed with Dr. Tj Head to evaluate for AVG removal.  
 
-Will probably need long-term antibiotics at least for 6 weeks. can be given on dialysis #5 hypertension
-amlodipine and hydralazine were discontinued #6 hyponatremia 
-Sodium today is 126 
-Will plan dialysis with high sodium bath. -Restrict fluid intake 
 
 
 
________________________________________________________________________ Signed: Angelito De Leon MD 
 
 
 
 

(4) no impairment

## 2021-08-17 NOTE — ED ADULT TRIAGE NOTE - SPO2 (%)
Assessment/Plan:    No problem-specific Assessment & Plan notes found for this encounter  Diagnoses and all orders for this visit:    Essential hypertension  Comments:  keep a home long    Hypercholesterolemia    Acute pain of right shoulder  -     methylPREDNISolone 4 MG tablet therapy pack; Use as directed on package          PHQ-9 Depression Screening    PHQ-9:   Frequency of the following problems over the past two weeks:                Subjective:      Patient ID: Alonso Saunders is a 54 y o  female  Follow up for cholesterol and HTN    Wrist Pain   Pertinent negatives include no fever  Hypertension  This is a chronic problem  The current episode started more than 1 year ago  There are no associated agents to hypertension  Risk factors for coronary artery disease include obesity and sedentary lifestyle  Past treatments include beta blockers and diuretics  Compliance problems include diet and exercise  Shoulder Pain   The pain is present in the right shoulder  This is a new problem  The current episode started in the past 7 days  The problem occurs constantly  The quality of the pain is described as aching  The pain is moderate  Pertinent negatives include no fever  Treatments tried: voltaren cream  The treatment provided mild relief  The following portions of the patient's history were reviewed and updated as appropriate: allergies, current medications, past family history, past medical history, past social history, past surgical history and problem list     Review of Systems   Constitutional: Negative for chills and fever  Skin: Negative for rash  Objective:    /92   Pulse 60   Temp 97 7 °F (36 5 °C)   Resp 18   Ht 5' 3" (1 6 m)   Wt 83 5 kg (184 lb)   SpO2 99%   BMI 32 59 kg/m²      Physical Exam  Vitals and nursing note reviewed  Constitutional:       General: She is not in acute distress  Appearance: Normal appearance   She is not ill-appearing, toxic-appearing or diaphoretic  HENT:      Head: Normocephalic and atraumatic  Eyes:      Conjunctiva/sclera: Conjunctivae normal    Cardiovascular:      Rate and Rhythm: Normal rate and regular rhythm  Heart sounds: Normal heart sounds  No murmur heard  Pulmonary:      Effort: Pulmonary effort is normal  No respiratory distress  Breath sounds: Normal breath sounds  No wheezing, rhonchi or rales  Musculoskeletal:      Cervical back: Normal range of motion and neck supple  No rigidity  Right lower leg: No edema  Left lower leg: No edema  Lymphadenopathy:      Cervical: No cervical adenopathy  Neurological:      Mental Status: She is alert and oriented to person, place, and time  Psychiatric:         Mood and Affect: Mood normal          Behavior: Behavior normal          Thought Content: Thought content normal          Judgment: Judgment normal        Right Shoulder Exam     Tenderness   Right shoulder tenderness location: posterior shoulder      Range of Motion   Active abduction: normal   Passive abduction: normal   Extension: normal   External rotation: normal   Forward flexion: normal     Other   Erythema: absent  Scars: absent 99

## 2022-01-28 NOTE — ED PROVIDER NOTE - CROS ED MUSC ALL NEG
negative... Siliq Pregnancy And Lactation Text: The risk during pregnancy and breastfeeding is uncertain with this medication.

## 2023-09-15 NOTE — ED ADULT NURSE NOTE - CAS TRG GENERAL AIRWAY, MLM
Assessment:  Isamar Martinez is a 52 y.o. female   Chief Complaint   Patient presents with    Right Hand - Pain       Encounter Diagnosis   Name Primary?    Trigger middle finger of right hand Yes        Plan:  Reviewed your x-rays with you today and discussed pertinent findings.   Reviewed your x-rays with you today and discussed pertinent findings.   We have reviewed the natural history of this disorder and discussed treatment and management options moving forward   corticosteroid injection   Oval 8 at night   Topicals  Apply topical diclofenac (Voltaren) up to 4 times a day to the affected area.  It can be bought over the counter at any local pharmacy.     We discussed the proper protocols after the injection such as no submerging pools, baths tubs, or hot tubs for 24 hr.  Showering is okay today.  We also discussed that blood sugars can be elevated after an injection and asked patient to properly checked her sugars over the next few days and contact their PCP if there are any concerns.  We discussed red flags such as fevers, chills, red, warm, tender joint at the area of injection to please seek medical care immediately.            Follow-up: As needed or sooner if there are any problems between now and then.    Thank you for choosing [facility] and Dr. Julian Fernandez for your orthopedic & sports medicine care. It is our goal to provide you with exceptional care that will help keep you healthy, active, and get you back in the game.    Please do not hesitate to reach out to us via email, phone, or MyChart with any questions, concerns, or feedback.    If you felt that you received exemplary care today, please consider leaving us feedback on Healthgrades at:  [email or web address]    If you are experiencing pain/discomfort ,or have questions after 5pm and would like to be connected to the [facility] on-call team, please  call this number and specify which Sports Medicine provider is treating you: [phone]     Patent

## 2024-01-09 NOTE — ED ADULT NURSE NOTE - RESPIRATORY WDL
0 = swallows foods/liquids without difficulty Breathing spontaneous and unlabored. Breath sounds clear and equal bilaterally with regular rhythm.

## 2024-08-15 NOTE — PROGRESS NOTE ADULT - PROBLEM SELECTOR PLAN 2
POA and due to the above  1/24/2021 I started antibx on 1/23/2021 fir Urinary Tract Infection: pt c/o of frequency and discoloration  and urine cx positive for e coli  1/2/25/2021 No

## 2025-04-11 NOTE — ED ADULT NURSE NOTE - CAS TRG GENERAL AIRWAY, MLM
Addended by: DARIA POLLACK on: 12/3/2024 12:55 PM     Modules accepted: Level of Service     Yes Patent